# Patient Record
Sex: FEMALE | Race: WHITE | Employment: PART TIME | URBAN - METROPOLITAN AREA
[De-identification: names, ages, dates, MRNs, and addresses within clinical notes are randomized per-mention and may not be internally consistent; named-entity substitution may affect disease eponyms.]

---

## 2017-01-20 ENCOUNTER — HOSPITAL ENCOUNTER (OUTPATIENT)
Dept: GENERAL RADIOLOGY | Age: 67
Discharge: HOME | End: 2017-01-20
Payer: COMMERCIAL

## 2017-01-20 DIAGNOSIS — R05.9 COUGH: ICD-10-CM

## 2017-01-20 PROCEDURE — 71020 XR CHEST PA LAT: CPT

## 2017-04-03 ENCOUNTER — HOSPITAL ENCOUNTER (OUTPATIENT)
Dept: CT IMAGING | Age: 67
Discharge: HOME | End: 2017-04-03
Attending: INTERNAL MEDICINE
Payer: MEDICARE

## 2017-04-03 DIAGNOSIS — A31.0 PULMONARY DISEASES DUE TO OTHER MYCOBACTERIA: ICD-10-CM

## 2017-04-03 PROCEDURE — 71250 CT THORAX DX C-: CPT

## 2017-07-27 ENCOUNTER — HOSPITAL ENCOUNTER (EMERGENCY)
Age: 67
Discharge: HOME | End: 2017-07-28
Attending: EMERGENCY MEDICINE
Payer: MEDICARE

## 2017-07-27 ENCOUNTER — APPOINTMENT (OUTPATIENT)
Dept: GENERAL RADIOLOGY | Age: 67
End: 2017-07-27
Attending: EMERGENCY MEDICINE
Payer: MEDICARE

## 2017-07-27 DIAGNOSIS — J47.1 BRONCHIECTASIS WITH (ACUTE) EXACERBATION (HCC): ICD-10-CM

## 2017-07-27 DIAGNOSIS — J44.1 CHRONIC OBSTRUCTIVE PULMONARY DISEASE WITH ACUTE EXACERBATION (HCC): Primary | ICD-10-CM

## 2017-07-27 DIAGNOSIS — J18.9 PNEUMONIA OF LEFT UPPER LOBE DUE TO INFECTIOUS ORGANISM: ICD-10-CM

## 2017-07-27 LAB
ALBUMIN SERPL BCP-MCNC: 3.5 G/DL (ref 3.5–5)
ALBUMIN/GLOB SERPL: 0.8 {RATIO} (ref 1.1–2.2)
ALP SERPL-CCNC: 88 U/L (ref 45–117)
ALT SERPL-CCNC: 25 U/L (ref 12–78)
ANION GAP BLD CALC-SCNC: 8 MMOL/L (ref 5–15)
AST SERPL W P-5'-P-CCNC: 23 U/L (ref 15–37)
BASOPHILS # BLD AUTO: 0 K/UL (ref 0–0.1)
BASOPHILS # BLD: 0 % (ref 0–1)
BILIRUB SERPL-MCNC: 0.2 MG/DL (ref 0.2–1)
BUN SERPL-MCNC: 18 MG/DL (ref 6–20)
BUN/CREAT SERPL: 20 (ref 12–20)
CALCIUM SERPL-MCNC: 9.6 MG/DL (ref 8.5–10.1)
CHLORIDE SERPL-SCNC: 104 MMOL/L (ref 97–108)
CK SERPL-CCNC: 136 U/L (ref 26–192)
CO2 SERPL-SCNC: 26 MMOL/L (ref 21–32)
CREAT SERPL-MCNC: 0.92 MG/DL (ref 0.55–1.02)
EOSINOPHIL # BLD: 0 K/UL (ref 0–0.4)
EOSINOPHIL NFR BLD: 0 % (ref 0–7)
ERYTHROCYTE [DISTWIDTH] IN BLOOD BY AUTOMATED COUNT: 15.3 % (ref 11.5–14.5)
GLOBULIN SER CALC-MCNC: 4.3 G/DL (ref 2–4)
GLUCOSE SERPL-MCNC: 108 MG/DL (ref 65–100)
HCT VFR BLD AUTO: 39.3 % (ref 35–47)
HGB BLD-MCNC: 12.8 G/DL (ref 11.5–16)
LYMPHOCYTES # BLD AUTO: 23 % (ref 12–49)
LYMPHOCYTES # BLD: 2.4 K/UL (ref 0.8–3.5)
MAGNESIUM SERPL-MCNC: 2.1 MG/DL (ref 1.6–2.4)
MCH RBC QN AUTO: 28.9 PG (ref 26–34)
MCHC RBC AUTO-ENTMCNC: 32.6 G/DL (ref 30–36.5)
MCV RBC AUTO: 88.7 FL (ref 80–99)
MONOCYTES # BLD: 0.7 K/UL (ref 0–1)
MONOCYTES NFR BLD AUTO: 6 % (ref 5–13)
NEUTS SEG # BLD: 7.5 K/UL (ref 1.8–8)
NEUTS SEG NFR BLD AUTO: 71 % (ref 32–75)
PHOSPHATE SERPL-MCNC: 3.2 MG/DL (ref 2.6–4.7)
PLATELET # BLD AUTO: 222 K/UL (ref 150–400)
POTASSIUM SERPL-SCNC: 4.1 MMOL/L (ref 3.5–5.1)
PROT SERPL-MCNC: 7.8 G/DL (ref 6.4–8.2)
RBC # BLD AUTO: 4.43 M/UL (ref 3.8–5.2)
SODIUM SERPL-SCNC: 138 MMOL/L (ref 136–145)
TROPONIN I SERPL-MCNC: <0.04 NG/ML
WBC # BLD AUTO: 10.6 K/UL (ref 3.6–11)

## 2017-07-27 PROCEDURE — 77030029684 HC NEB SM VOL KT MONA -A

## 2017-07-27 PROCEDURE — 85379 FIBRIN DEGRADATION QUANT: CPT | Performed by: EMERGENCY MEDICINE

## 2017-07-27 PROCEDURE — 99285 EMERGENCY DEPT VISIT HI MDM: CPT

## 2017-07-27 PROCEDURE — 74011250637 HC RX REV CODE- 250/637: Performed by: EMERGENCY MEDICINE

## 2017-07-27 PROCEDURE — 71020 XR CHEST PA LAT: CPT

## 2017-07-27 PROCEDURE — 80053 COMPREHEN METABOLIC PANEL: CPT | Performed by: EMERGENCY MEDICINE

## 2017-07-27 PROCEDURE — 84484 ASSAY OF TROPONIN QUANT: CPT | Performed by: EMERGENCY MEDICINE

## 2017-07-27 PROCEDURE — 84100 ASSAY OF PHOSPHORUS: CPT | Performed by: EMERGENCY MEDICINE

## 2017-07-27 PROCEDURE — 74011000250 HC RX REV CODE- 250: Performed by: EMERGENCY MEDICINE

## 2017-07-27 PROCEDURE — 85610 PROTHROMBIN TIME: CPT | Performed by: EMERGENCY MEDICINE

## 2017-07-27 PROCEDURE — 36415 COLL VENOUS BLD VENIPUNCTURE: CPT | Performed by: EMERGENCY MEDICINE

## 2017-07-27 PROCEDURE — 93005 ELECTROCARDIOGRAM TRACING: CPT

## 2017-07-27 PROCEDURE — 94640 AIRWAY INHALATION TREATMENT: CPT

## 2017-07-27 PROCEDURE — 83735 ASSAY OF MAGNESIUM: CPT | Performed by: EMERGENCY MEDICINE

## 2017-07-27 PROCEDURE — 85025 COMPLETE CBC W/AUTO DIFF WBC: CPT | Performed by: EMERGENCY MEDICINE

## 2017-07-27 PROCEDURE — 82550 ASSAY OF CK (CPK): CPT | Performed by: EMERGENCY MEDICINE

## 2017-07-27 PROCEDURE — 85730 THROMBOPLASTIN TIME PARTIAL: CPT | Performed by: EMERGENCY MEDICINE

## 2017-07-27 RX ORDER — ASPIRIN 325 MG
325 TABLET ORAL ONCE
Status: COMPLETED | OUTPATIENT
Start: 2017-07-27 | End: 2017-07-27

## 2017-07-27 RX ORDER — HYDROCODONE POLISTIREX AND CHLORPHENIRAMINE POLISTIREX 10; 8 MG/5ML; MG/5ML
5 SUSPENSION, EXTENDED RELEASE ORAL
Status: COMPLETED | OUTPATIENT
Start: 2017-07-27 | End: 2017-07-27

## 2017-07-27 RX ADMIN — ALBUTEROL SULFATE 1 DOSE: 2.5 SOLUTION RESPIRATORY (INHALATION) at 23:24

## 2017-07-27 RX ADMIN — HYDROCODONE POLISTIREX AND CHLORPHENIRAMINE POLISTIREX 5 ML: 10; 8 SUSPENSION, EXTENDED RELEASE ORAL at 23:27

## 2017-07-27 RX ADMIN — ASPIRIN 325 MG: 325 TABLET ORAL at 23:34

## 2017-07-28 ENCOUNTER — APPOINTMENT (OUTPATIENT)
Dept: CT IMAGING | Age: 67
End: 2017-07-28
Attending: EMERGENCY MEDICINE
Payer: MEDICARE

## 2017-07-28 VITALS
TEMPERATURE: 98.3 F | RESPIRATION RATE: 22 BRPM | DIASTOLIC BLOOD PRESSURE: 64 MMHG | HEIGHT: 66 IN | OXYGEN SATURATION: 94 % | WEIGHT: 156.38 LBS | SYSTOLIC BLOOD PRESSURE: 129 MMHG | HEART RATE: 111 BPM | BODY MASS INDEX: 25.13 KG/M2

## 2017-07-28 LAB
APTT PPP: 28.3 SEC (ref 22.1–32.5)
ATRIAL RATE: 101 BPM
ATRIAL RATE: 110 BPM
CALCULATED P AXIS, ECG09: 54 DEGREES
CALCULATED P AXIS, ECG09: 73 DEGREES
CALCULATED R AXIS, ECG10: -14 DEGREES
CALCULATED R AXIS, ECG10: 51 DEGREES
CALCULATED T AXIS, ECG11: 45 DEGREES
CALCULATED T AXIS, ECG11: 66 DEGREES
D DIMER PPP FEU-MCNC: 0.37 MG/L FEU (ref 0–0.65)
DIAGNOSIS, 93000: NORMAL
DIAGNOSIS, 93000: NORMAL
INR PPP: 1 (ref 0.9–1.1)
P-R INTERVAL, ECG05: 178 MS
P-R INTERVAL, ECG05: 180 MS
PROTHROMBIN TIME: 10.6 SEC (ref 9–11.1)
Q-T INTERVAL, ECG07: 344 MS
Q-T INTERVAL, ECG07: 346 MS
QRS DURATION, ECG06: 78 MS
QRS DURATION, ECG06: 82 MS
QTC CALCULATION (BEZET), ECG08: 446 MS
QTC CALCULATION (BEZET), ECG08: 468 MS
THERAPEUTIC RANGE,PTTT: NORMAL SECS (ref 58–77)
TROPONIN I SERPL-MCNC: <0.04 NG/ML
VENTRICULAR RATE, ECG03: 101 BPM
VENTRICULAR RATE, ECG03: 110 BPM

## 2017-07-28 PROCEDURE — 71275 CT ANGIOGRAPHY CHEST: CPT

## 2017-07-28 PROCEDURE — 84484 ASSAY OF TROPONIN QUANT: CPT | Performed by: EMERGENCY MEDICINE

## 2017-07-28 PROCEDURE — 74011250637 HC RX REV CODE- 250/637: Performed by: EMERGENCY MEDICINE

## 2017-07-28 PROCEDURE — 36415 COLL VENOUS BLD VENIPUNCTURE: CPT | Performed by: EMERGENCY MEDICINE

## 2017-07-28 PROCEDURE — 74011000258 HC RX REV CODE- 258: Performed by: EMERGENCY MEDICINE

## 2017-07-28 PROCEDURE — 74011636320 HC RX REV CODE- 636/320: Performed by: EMERGENCY MEDICINE

## 2017-07-28 PROCEDURE — 93005 ELECTROCARDIOGRAM TRACING: CPT

## 2017-07-28 RX ORDER — SODIUM CHLORIDE 0.9 % (FLUSH) 0.9 %
10 SYRINGE (ML) INJECTION
Status: COMPLETED | OUTPATIENT
Start: 2017-07-28 | End: 2017-07-28

## 2017-07-28 RX ORDER — LEVOFLOXACIN 750 MG/1
750 TABLET ORAL DAILY
Qty: 10 TAB | Refills: 0 | Status: SHIPPED | OUTPATIENT
Start: 2017-07-28

## 2017-07-28 RX ORDER — HYDROCODONE POLISTIREX AND CHLORPHENIRAMINE POLISTIREX 10; 8 MG/5ML; MG/5ML
5 SUSPENSION, EXTENDED RELEASE ORAL
Qty: 60 ML | Refills: 0 | Status: SHIPPED | OUTPATIENT
Start: 2017-07-28

## 2017-07-28 RX ORDER — PREDNISONE 20 MG/1
60 TABLET ORAL DAILY
Qty: 18 TAB | Refills: 0 | Status: SHIPPED | OUTPATIENT
Start: 2017-07-28 | End: 2017-08-03

## 2017-07-28 RX ADMIN — SODIUM CHLORIDE 100 ML: 900 INJECTION, SOLUTION INTRAVENOUS at 00:34

## 2017-07-28 RX ADMIN — LEVOFLOXACIN 750 MG: 500 TABLET, FILM COATED ORAL at 02:13

## 2017-07-28 RX ADMIN — IOPAMIDOL 70 ML: 755 INJECTION, SOLUTION INTRAVENOUS at 00:34

## 2017-07-28 RX ADMIN — Medication 10 ML: at 00:34

## 2017-07-28 NOTE — ED NOTES
Bedside and Verbal shift change report given to Garret Ayala RN (oncoming nurse) by John Espinoza RN (offgoing nurse). Report included the following information SBAR, ED Summary, MAR, Recent Results and Cardiac Rhythm sinus tach.

## 2017-07-28 NOTE — ED PROVIDER NOTES
HPI Comments: 74 y.o. female with past medical history significant for COPD, anemia, PNA, arrhythmia, asthma, GERD who presents accompanied by  with chief complaint of chest pain. Patient complains of \"7 or 8/10\" left sided chest pain that came on suddenly while she was watching TV around 1930 today. Patient states the pain is dull at rest then sharp with breathing. Patient denies previous occurrence of chest pain. Patient states she's also had an occasionally productive cough (reports nonproductive tonight) for some time. Patient states she had a bronchoscopy on 6/7/17 and coughed up blood 5 days after. Patient states she has an inhaler that she uses BID. Patient denies nausea or vomiting. There are no other acute medical concerns at this time. Social hx: former smoker, occasional alcohol drinker, no illicit drugs  PCP: Doreen Trujillo MD  Cardiologist: Addie Hankins MD ([facility])    Note written by Lucretia Gillespie, as dictated by Neel Haider MD 11:13 PM      The history is provided by the patient. No  was used. Past Medical History:   Diagnosis Date    Anemia NEC 9/12    mild with GI Bleeding    Arrhythmia     fast HR    Arthritis     psoriatic    Asthma     COPD     wheezing, tightness in chest, sob, fast heart beat, coughing.     Esophageal spasm     GERD (gastroesophageal reflux disease)     GI bleed     Pneumonia 1988, 6/2011       Past Surgical History:   Procedure Laterality Date    COLONOSCOPY  10/23/12    Brand-int hemorrhoids,     EGD      Brand    HX CATARACT REMOVAL  1/2011    lens implant (L) eye/and right eye    [facility]    laparoscopy to become pregnant (endometrosis)    FATOU MAMMOGRAM DIGITAL BILATERAL  2/13    PAP SMEAR  8/12    PNEUM MARVIN W/RENETTA PRESSURE           Family History:   Problem Relation Age of Onset    Hypertension Mother     Hypertension Father     Arthritis-osteo Father        Social History     Social History    Marital status:      Spouse name: N/A    Number of children: 1    Years of education: N/A     Occupational History    Realtor      Social History Main Topics    Smoking status: Former Smoker     Packs/day: 1.00     Years: 2.00     Types: Cigarettes     Quit date: 1/1/1978    Smokeless tobacco: Never Used    Alcohol use 1.0 oz/week     1 Cans of beer, 1 Shots of liquor per week      Comment: 2-3 per week,     Drug use: Not on file    Sexual activity: Yes     Partners: Male     Birth control/ protection: None     Other Topics Concern    Not on file     Social History Narrative         ALLERGIES: Macrodantin [nitrofurantoin macrocrystalline] and Nsaids (non-steroidal anti-inflammatory drug)    Review of Systems   Constitutional: Negative for appetite change, chills, fatigue and fever. HENT: Negative for congestion, rhinorrhea and sore throat. Respiratory: Positive for cough. Negative for shortness of breath. Cardiovascular: Positive for chest pain. Negative for leg swelling. Gastrointestinal: Negative for abdominal pain, constipation, nausea and vomiting. Genitourinary: Negative for difficulty urinating and dysuria. Musculoskeletal: Negative for back pain and neck pain. Skin: Negative for rash and wound. Neurological: Negative for headaches. All other systems reviewed and are negative. Vitals:    07/27/17 2229   BP: 139/61   Pulse: (!) 116   Resp: 16   Temp: 98.3 °F (36.8 °C)   SpO2: 93%   Weight: 70.9 kg (156 lb 6 oz)   Height: 5' 6\" (1.676 m)            Physical Exam   Nursing note and vitals reviewed. CONSTITUTIONAL: Well-appearing; well-nourished; in no apparent distress  HEAD: Normocephalic; atraumatic  EYES: PERRL; EOM intact; conjunctiva and sclera are clear bilaterally. ENT: No rhinorrhea; normal pharynx with no tonsillar hypertrophy; mucous membranes pink/moist, no erythema, no exudate.   NECK: Supple; non-tender; no cervical lymphadenopathy  CARD: Normal S1, S2; no murmurs, rubs, or gallops. Increased Regular rate and rhythm. RESP: Normal respiratory effort; breath sounds clear and equal bilaterally; no wheezes, rhonchi, or rales. ABD: Normal bowel sounds; non-distended; non-tender; no palpable organomegaly, no masses, no bruits. Back Exam: Normal inspection; no vertebral point tenderness, no CVA tenderness. Normal range of motion. EXT: Normal ROM in all four extremities; non-tender to palpation; no swelling or deformity; distal pulses are normal, no edema. SKIN: Warm; dry; no rash. NEURO:Alert and oriented x 3, coherent, RUFUS-XII grossly intact, sensory and motor are non-focal.        MDM  Number of Diagnoses or Management Options  Bronchiectasis with (acute) exacerbation ([facility]):   Chronic obstructive pulmonary disease with acute exacerbation ([facility]):   Pneumonia of left upper lobe due to infectious organism [facility]):   Diagnosis management comments: Assessment: 57-year-old female with history of prostate disease, and pneumonia, who presents with intermittent episodes of left-sided chest pain, with a pleuritic component. The fascia diagnosis includes ACS, and electrolyte abnormality, pneumonia, VTE, pleurisy, pneumothorax. Plan: EKG/ chest x-ray/ lab/ IV fluid/ Duo Neb and steroid/ CT scan of the chest/ serial exams/ Monitor and Reevaluate.          Amount and/or Complexity of Data Reviewed  Clinical lab tests: ordered and reviewed  Tests in the radiology section of CPT®: ordered and reviewed  Tests in the medicine section of CPT®: reviewed and ordered  Discussion of test results with the performing providers: yes  Decide to obtain previous medical records or to obtain history from someone other than the patient: yes  Obtain history from someone other than the patient: yes  Review and summarize past medical records: yes  Discuss the patient with other providers: yes  Independent visualization of images, tracings, or specimens: yes    Risk of Complications, Morbidity, and/or Mortality  Presenting problems: moderate  Diagnostic procedures: moderate  Management options: moderate      ED Course       Procedures     ED EKG interpretation:  Rhythm: sinus tachycardia; and regular . Rate (approx.): 101; Axis: left axis deviation; P wave: normal; QRS interval: normal ; ST/T wave: non-specific changes; in  Lead: Diffusely; Other findings: abnormal ekg. This EKG was interpreted by Lisa Thompson MD,ED Provider. XRAY INTERPRETATION (ED MD)  Chest Xray  Left upper lobe opacities concerning for pneumonia. Normal heart size. No bony abnormalities. Lisa Thompson MD 10:56 PM    Progress Note:   Pt has been reexamined by Lisa Thompson MD. Pt is feeling much better. Symptoms have improved. All available results have been reviewed with pt and any available family. the patient had  2 negative. EKG and set of cardiac enzymes. Symptoms appear consistent with pneumonia/pleurisy. She is afebrile, non-tachypneic and not hypoxic and vital signs are stable. Pt understands sx, dx, and tx in ED. Care plan has been outlined and questions have been answered. Pt is ready to go home. Will send home on chest pain/ pneumonia/ COPD exacerbation/bronchiectasis. Instructions. Prescription of Tussionex, Levaquin, and prednisone. Outpatient referral with PCP/ Pulmonologist in 3-5 days for reevaluation as needed. Written by Lisa Thompson MD,2:57 AM    .   .

## 2017-07-28 NOTE — ED NOTES
Pt remains tachycardic 1 hr post neb treatment. Pt states that she has a \"fast heart beat\" and takes Diltiazem at home for it. MD notified. No further orders received.

## 2017-07-28 NOTE — ED NOTES
Pt returned from CT. Patient has no complaints at this time. Rates pain a 4/10. Does not have to use the restroom at this time. Patient in a position of comfort. Belongings secure in their room, side rails up x 2, bedside table within reach, and call bell within reach. Will continue to monitor and re-assess as appropriate.

## 2017-07-28 NOTE — DISCHARGE INSTRUCTIONS
We hope that we have addressed all of your medical concerns. The examination and treatment you received in the Emergency Department were for an emergent problem and were not intended as complete care. It is important that you follow up with your healthcare provider(s) for ongoing care. If your symptoms worsen or do not improve as expected, and you are unable to reach your usual health care provider(s), you should return to the Emergency Department. Today's healthcare is undergoing tremendous change, and patient satisfaction surveys are one of the many tools to assess the quality of medical care. You may receive a survey from the [employer] organization regarding your experience in the Emergency Department. I hope that your experience has been completely positive, particularly the medical care that I provided. As such, please participate in the survey; anything less than excellent does not meet my expectations or intentions. [facility] and [facility] participate in nationally recognized quality of care measures. If your blood pressure is greater than 120/80, as reported below, we urge that you seek medical care to address the potential of high blood pressure, commonly known as hypertension. Hypertension can be hereditary or can be caused by certain medical conditions, pain, stress, or \"white coat syndrome. \"       Please make an appointment with your health care provider(s) for follow up of your Emergency Department visit.        VITALS:   Patient Vitals for the past 8 hrs:   Temp Pulse Resp BP SpO2   07/28/17 0200 - (!) 111 22 123/52 92 %   07/28/17 0130 - (!) 120 19 123/54 93 %   07/28/17 0100 - (!) 125 16 129/52 96 %   07/28/17 0030 - (!) 118 18 142/52 94 %   07/28/17 0000 - (!) 109 21 148/58 99 %   07/27/17 2330 - 94 21 134/65 97 %   07/27/17 2300 - 95 23 133/66 96 %   07/27/17 2229 98.3 °F (36.8 °C) (!) 116 16 139/61 93 %          Thank you for allowing us to provide you with medical care today. We realize that you have many choices for your emergency care needs. Please choose us in the future for any continued health care needs. Jenny Cabezas MD    [address] [facility].   Office: [phone]            Recent Results (from the past 24 hour(s))   CBC WITH AUTOMATED DIFF    Collection Time: 07/27/17 10:34 PM   Result Value Ref Range    WBC 10.6 3.6 - 11.0 K/uL    RBC 4.43 3.80 - 5.20 M/uL    HGB 12.8 11.5 - 16.0 g/dL    HCT 39.3 35.0 - 47.0 %    MCV 88.7 80.0 - 99.0 FL    MCH 28.9 26.0 - 34.0 PG    MCHC 32.6 30.0 - 36.5 g/dL    RDW 15.3 (H) 11.5 - 14.5 %    PLATELET 581 243 - 069 K/uL    NEUTROPHILS 71 32 - 75 %    LYMPHOCYTES 23 12 - 49 %    MONOCYTES 6 5 - 13 %    EOSINOPHILS 0 0 - 7 %    BASOPHILS 0 0 - 1 %    ABS. NEUTROPHILS 7.5 1.8 - 8.0 K/UL    ABS. LYMPHOCYTES 2.4 0.8 - 3.5 K/UL    ABS. MONOCYTES 0.7 0.0 - 1.0 K/UL    ABS. EOSINOPHILS 0.0 0.0 - 0.4 K/UL    ABS. BASOPHILS 0.0 0.0 - 0.1 K/UL   METABOLIC PANEL, COMPREHENSIVE    Collection Time: 07/27/17 10:34 PM   Result Value Ref Range    Sodium 138 136 - 145 mmol/L    Potassium 4.1 3.5 - 5.1 mmol/L    Chloride 104 97 - 108 mmol/L    CO2 26 21 - 32 mmol/L    Anion gap 8 5 - 15 mmol/L    Glucose 108 (H) 65 - 100 mg/dL    BUN 18 6 - 20 MG/DL    Creatinine 0.92 0.55 - 1.02 MG/DL    BUN/Creatinine ratio 20 12 - 20      GFR est AA >60 >60 ml/min/1.73m2    GFR est non-AA >60 >60 ml/min/1.73m2    Calcium 9.6 8.5 - 10.1 MG/DL    Bilirubin, total 0.2 0.2 - 1.0 MG/DL    ALT (SGPT) 25 12 - 78 U/L    AST (SGOT) 23 15 - 37 U/L    Alk.  phosphatase 88 45 - 117 U/L    Protein, total 7.8 6.4 - 8.2 g/dL    Albumin 3.5 3.5 - 5.0 g/dL    Globulin 4.3 (H) 2.0 - 4.0 g/dL    A-G Ratio 0.8 (L) 1.1 - 2.2     TROPONIN I    Collection Time: 07/27/17 10:34 PM   Result Value Ref Range    Troponin-I, Qt. <0.04 <0.05 ng/mL   CK W/ REFLX CKMB    Collection Time: 07/27/17 10:34 PM   Result Value Ref Range     26 - 192 U/L   D DIMER    Collection Time: 07/27/17 10:34 PM   Result Value Ref Range    D-dimer 0.37 0.00 - 0.65 mg/L FEU   MAGNESIUM    Collection Time: 07/27/17 10:34 PM   Result Value Ref Range    Magnesium 2.1 1.6 - 2.4 mg/dL   PTT    Collection Time: 07/27/17 10:34 PM   Result Value Ref Range    aPTT 28.3 22.1 - 32.5 sec    aPTT, therapeutic range     58.0 - 77.0 SECS   PROTHROMBIN TIME + INR    Collection Time: 07/27/17 10:34 PM   Result Value Ref Range    INR 1.0 0.9 - 1.1      Prothrombin time 10.6 9.0 - 11.1 sec   PHOSPHORUS    Collection Time: 07/27/17 10:34 PM   Result Value Ref Range    Phosphorus 3.2 2.6 - 4.7 MG/DL   EKG, 12 LEAD, INITIAL    Collection Time: 07/27/17 10:34 PM   Result Value Ref Range    Ventricular Rate 101 BPM    Atrial Rate 101 BPM    P-R Interval 178 ms    QRS Duration 82 ms    Q-T Interval 344 ms    QTC Calculation (Bezet) 446 ms    Calculated P Axis 73 degrees    Calculated R Axis 51 degrees    Calculated T Axis 66 degrees    Diagnosis       Sinus tachycardia  When compared with ECG of 06-SEP-2012 09:06,  No significant change was found     TROPONIN I    Collection Time: 07/28/17  1:37 AM   Result Value Ref Range    Troponin-I, Qt. <0.04 <0.05 ng/mL   EKG, 12 LEAD, INITIAL    Collection Time: 07/28/17  1:42 AM   Result Value Ref Range    Ventricular Rate 110 BPM    Atrial Rate 110 BPM    P-R Interval 180 ms    QRS Duration 78 ms    Q-T Interval 346 ms    QTC Calculation (Bezet) 468 ms    Calculated P Axis 54 degrees    Calculated R Axis -14 degrees    Calculated T Axis 45 degrees    Diagnosis       Sinus tachycardia  When compared with ECG of 27-JUL-2017 22:34,  MANUAL COMPARISON REQUIRED, DATA IS UNCONFIRMED         Xr Chest Pa Lat    Result Date: 7/27/2017  History: Chest pain. 2 views of the chest demonstrate unremarkable cardiomediastinal contours. There is parenchymal scarring bilaterally.  There is new airspace opacification in the left and upper and lower lobes. There is a calcified granuloma in the right lung. The osseous structures appear unremarkable. IMPRESSION: Left upper lobe and left lower lobe airspace opacification concerning for pneumonia. Cta Chest W Or W Wo Cont    Result Date: 7/28/2017  EXAM:  CTA CHEST W OR W WO CONT INDICATION:   cp COMPARISON: CT of the chest, 4/3/2017. Chloe Camp TECHNIQUE: Precontrast  images were obtained to localize the volume for acquisition. Multislice helical CT arteriography was performed from the diaphragm to the thoracic inlet during uneventful rapid bolus intravenous contrast administration. Lung and soft tissue windows were generated. Coronal and sagittal images were generated and 3D post processing consisting of coronal maximum intensity images was performed. CT dose reduction was achieved through use of a standardized protocol tailored for this examination and automatic exposure control for dose modulation. Chloe Camp FINDINGS: CHEST: Chest wall/thoracic inlet: Within normal limits. Thyroid: Within normal limits. Mediastinum/harsh: Within normal limits. Heart/vessels: No visible pulmonary embolus. Ectasia of the pulmonary artery measuring approximately 3.3 cm. Lungs/Pleura: Redemonstrated are multiple areas of tree in bud micronodularity and discrete nodules throughout the lungs, most pronounced in the bases with associated bronchiectasis. These nodules measure up to approximately 1 cm and do not appear significantly changed. Small area of groundglass opacity in the anterior aspect of the left upper lobe and in the lateral aspect of the right upper lobe . INCIDENTALLY IMAGED ABDOMEN: Punctate calculi in the spleen suggesting remote granulomatous disease. Hiatal hernia. Chloe Camp MSK: Within normal limits. . IMPRESSION: 1. No visible pulmonary embolus. 2. Diffuse tree-in-bud micronodular the with discrete nodules and associated bronchiectasis. Running suggest chronic atypical infectious process such as mycoplasma.  3. Small areas of groundglass attenuation in the left upper lobe and right upper lobe which could represent more active disease. Bronchiectasis: Care Instructions  Your Care Instructions  Bronchiectasis (say \"dxazg-zce-PYI-tuh-sanjay\") is a lung problem in which the breathing tubes are stretched and become larger. The buildup of mucus causes the stretching and can lead to swelling and infections. You may find it harder to breathe and cough up mucus out of your lungs. Some people are born with it. Other people get it because of another health problem, usually cystic fibrosis or a lung infection such as pneumonia or tuberculosis. Symptoms are often different for everyone. But a cough that brings up mucus, or sputum, is common. The condition is usually treated with antibiotics, medicines to relax the airways (bronchodilators), and medicines to make it easier to cough up mucus (expectorants). Follow-up care is a key part of your treatment and safety. Be sure to make and go to all appointments, and call your doctor if you are having problems. It's also a good idea to know your test results and keep a list of the medicines you take. How can you care for yourself at home? · Take your antibiotics as directed. Do not stop taking them just because you feel better. You need to take the full course of antibiotics. · Take your medicines exactly as prescribed. Call your doctor if you think you are having a problem with your medicine. · If you have cystic fibrosis, follow your treatment plan. · If you or your child has bronchiectasis, follow directions from your doctor or respiratory therapist for moving your or your child's body into different positions to help drain fluid. This is called postural drainage, and it helps to ease breathing and prevent infections. · You also may do chest percussion on your child. This is strong clapping of the chest with a cupped hand to vibrate the airways in the lungs.  The vibration helps your child cough up mucus. You may see a respiratory therapist to learn how to do chest percussion. · Use an airway clearance device, such as a flutter valve, as directed to help remove mucus from the lungs. · Avoid lung infections. ¨ Get shots to protect against the flu and pneumococcal disease. ¨ Wash your hands frequently. ¨ Avoid close contact with people who have colds or the flu. ¨ Do not smoke or allow others to smoke around you. If you need help quitting, talk to your doctor about stop-smoking programs and medicines. These can increase your chances of quitting for good. ¨ Stay inside, if you can, on days when the pollution level is high. When should you call for help? Call 911 anytime you think you may need emergency care. For example, call if:  · You have severe trouble breathing. · You cough up more than 1 cup of blood. Call your doctor now or seek immediate medical care if:  · You have chest pain. · You have shortness of breath. · You have a fever. · Your mucus (sputum) is bloody or changes color. Watch closely for changes in your health, and be sure to contact your doctor if:  · You are coughing up more sputum than before. · Your symptoms get worse or do not get better with treatment. Where can you learn more? Go to http://dary-ramon.info/. Enter C667 in the search box to learn more about \"Bronchiectasis: Care Instructions. \"  Current as of: March 25, 2017  Content Version: 11.3  © 1447-3772 [employer]. Care instructions adapted under license by [facility] (which disclaims liability or warranty for this information). If you have questions about a medical condition or this instruction, always ask your healthcare professional. [place] [address] any warranty or liability for your use of this information. Pneumonia: Care Instructions  Your Care Instructions    Pneumonia is an infection of the lungs.  Most cases are caused by infections from bacteria or viruses. Pneumonia may be mild or very severe. If it is caused by bacteria, you will be treated with antibiotics. It may take a few weeks to a few months to recover fully from pneumonia, depending on how sick you were and whether your overall health is good. Follow-up care is a key part of your treatment and safety. Be sure to make and go to all appointments, and call your doctor if you are having problems. Its also a good idea to know your test results and keep a list of the medicines you take. How can you care for yourself at home? · Take your antibiotics exactly as directed. Do not stop taking the medicine just because you are feeling better. You need to take the full course of antibiotics. · Take your medicines exactly as prescribed. Call your doctor if you think you are having a problem with your medicine. · Get plenty of rest and sleep. You may feel weak and tired for a while, but your energy level will improve with time. · To prevent dehydration, drink plenty of fluids, enough so that your urine is light yellow or clear like water. Choose water and other caffeine-free clear liquids until you feel better. If you have kidney, heart, or liver disease and have to limit fluids, talk with your doctor before you increase the amount of fluids you drink. · Take care of your cough so you can rest. A cough that brings up mucus from your lungs is common with pneumonia. It is one way your body gets rid of the infection. But if coughing keeps you from resting or causes severe fatigue and chest-wall pain, talk to your doctor. He or she may suggest that you take a medicine to reduce the cough. · Use a vaporizer or humidifier to add moisture to your bedroom. Follow the directions for cleaning the machine. · Do not smoke or allow others to smoke around you. Smoke will make your cough last longer.  If you need help quitting, talk to your doctor about stop-smoking programs and medicines. These can increase your chances of quitting for good. · Take an over-the-counter pain medicine, such as acetaminophen (Tylenol), ibuprofen (Advil, Motrin), or naproxen (Aleve). Read and follow all instructions on the label. · Do not take two or more pain medicines at the same time unless the doctor told you to. Many pain medicines have acetaminophen, which is Tylenol. Too much acetaminophen (Tylenol) can be harmful. · If you were given a spirometer to measure how well your lungs are working, use it as instructed. This can help your doctor tell how your recovery is going. · To prevent pneumonia in the future, talk to your doctor about getting a flu vaccine (once a year) and a pneumococcal vaccine (one time only for most people). When should you call for help? Call 911 anytime you think you may need emergency care. For example, call if:  · You have severe trouble breathing. Call your doctor now or seek immediate medical care if:  · You cough up dark brown or bloody mucus (sputum). · You have new or worse trouble breathing. · You are dizzy or lightheaded, or you feel like you may faint. Watch closely for changes in your health, and be sure to contact your doctor if:  · You have a new or higher fever. · You are coughing more deeply or more often. · You are not getting better after 2 days (48 hours). · You do not get better as expected. Where can you learn more? Go to http://dary-ramon.info/. Enter [id number] in the search box to learn more about \"Pneumonia: Care Instructions. \"  Current as of: March 25, 2017  Content Version: 11.3  © 8318-9715 [employer]. Care instructions adapted under license by [employer] (which disclaims liability or warranty for this information).  If you have questions about a medical condition or this instruction, always ask your healthcare professional. [facility] any warranty or liability for your use of this information.

## 2017-07-28 NOTE — ED TRIAGE NOTES
Pt states that she has left sided chest pain that started around 7pm, pt states that she also has SOB, pt denies NVD.

## 2018-01-15 ENCOUNTER — HOSPITAL ENCOUNTER (OUTPATIENT)
Dept: GENERAL RADIOLOGY | Age: 68
Discharge: HOME | End: 2018-01-15
Payer: MEDICARE

## 2018-01-15 DIAGNOSIS — R06.02 SOB (SHORTNESS OF BREATH): ICD-10-CM

## 2018-01-15 PROCEDURE — 71046 X-RAY EXAM CHEST 2 VIEWS: CPT

## 2021-06-06 ENCOUNTER — HOSPITAL ENCOUNTER (EMERGENCY)
Age: 71
Discharge: HOME | End: 2021-06-07
Attending: EMERGENCY MEDICINE
Payer: MEDICARE

## 2021-06-06 ENCOUNTER — APPOINTMENT (OUTPATIENT)
Dept: CT IMAGING | Age: 71
End: 2021-06-06
Attending: EMERGENCY MEDICINE
Payer: MEDICARE

## 2021-06-06 DIAGNOSIS — Z22.39 MYCOBACTERIUM AVIUM COMPLEX COLONIZATION: ICD-10-CM

## 2021-06-06 DIAGNOSIS — R07.89 OTHER CHEST PAIN: Primary | ICD-10-CM

## 2021-06-06 LAB
ALBUMIN SERPL-MCNC: 3.9 G/DL (ref 3.5–5)
ALBUMIN/GLOB SERPL: 0.8 {RATIO} (ref 1.1–2.2)
ALP SERPL-CCNC: 105 U/L (ref 45–117)
ALT SERPL-CCNC: 24 U/L (ref 12–78)
ANION GAP SERPL CALC-SCNC: 7 MMOL/L (ref 5–15)
AST SERPL-CCNC: 20 U/L (ref 15–37)
BASOPHILS # BLD: 0.1 K/UL (ref 0–0.1)
BASOPHILS NFR BLD: 0 % (ref 0–1)
BILIRUB SERPL-MCNC: 0.4 MG/DL (ref 0.2–1)
BUN SERPL-MCNC: 14 MG/DL (ref 6–20)
BUN/CREAT SERPL: 18 (ref 12–20)
CALCIUM SERPL-MCNC: 9.9 MG/DL (ref 8.5–10.1)
CHLORIDE SERPL-SCNC: 105 MMOL/L (ref 97–108)
CO2 SERPL-SCNC: 25 MMOL/L (ref 21–32)
COMMENT, HOLDF: NORMAL
CREAT SERPL-MCNC: 0.79 MG/DL (ref 0.55–1.02)
DIFFERENTIAL METHOD BLD: ABNORMAL
EOSINOPHIL # BLD: 0 K/UL (ref 0–0.4)
EOSINOPHIL NFR BLD: 0 % (ref 0–7)
ERYTHROCYTE [DISTWIDTH] IN BLOOD BY AUTOMATED COUNT: 15 % (ref 11.5–14.5)
GLOBULIN SER CALC-MCNC: 4.9 G/DL (ref 2–4)
GLUCOSE SERPL-MCNC: 106 MG/DL (ref 65–100)
HCT VFR BLD AUTO: 42.4 % (ref 35–47)
HGB BLD-MCNC: 13.5 G/DL (ref 11.5–16)
IMM GRANULOCYTES # BLD AUTO: 0.1 K/UL (ref 0–0.04)
IMM GRANULOCYTES NFR BLD AUTO: 1 % (ref 0–0.5)
LYMPHOCYTES # BLD: 1.5 K/UL (ref 0.8–3.5)
LYMPHOCYTES NFR BLD: 10 % (ref 12–49)
MCH RBC QN AUTO: 29.8 PG (ref 26–34)
MCHC RBC AUTO-ENTMCNC: 31.8 G/DL (ref 30–36.5)
MCV RBC AUTO: 93.6 FL (ref 80–99)
MONOCYTES # BLD: 0.8 K/UL (ref 0–1)
MONOCYTES NFR BLD: 5 % (ref 5–13)
NEUTS SEG # BLD: 11.9 K/UL (ref 1.8–8)
NEUTS SEG NFR BLD: 84 % (ref 32–75)
NRBC # BLD: 0 K/UL (ref 0–0.01)
NRBC BLD-RTO: 0 PER 100 WBC
PLATELET # BLD AUTO: 231 K/UL (ref 150–400)
PMV BLD AUTO: 10 FL (ref 8.9–12.9)
POTASSIUM SERPL-SCNC: 3.7 MMOL/L (ref 3.5–5.1)
PROT SERPL-MCNC: 8.8 G/DL (ref 6.4–8.2)
RBC # BLD AUTO: 4.53 M/UL (ref 3.8–5.2)
SAMPLES BEING HELD,HOLD: NORMAL
SODIUM SERPL-SCNC: 137 MMOL/L (ref 136–145)
TROPONIN I SERPL-MCNC: <0.05 NG/ML
WBC # BLD AUTO: 14.3 K/UL (ref 3.6–11)

## 2021-06-06 PROCEDURE — 84484 ASSAY OF TROPONIN QUANT: CPT

## 2021-06-06 PROCEDURE — 93005 ELECTROCARDIOGRAM TRACING: CPT

## 2021-06-06 PROCEDURE — 99284 EMERGENCY DEPT VISIT MOD MDM: CPT

## 2021-06-06 PROCEDURE — 85025 COMPLETE CBC W/AUTO DIFF WBC: CPT

## 2021-06-06 PROCEDURE — 74011250637 HC RX REV CODE- 250/637: Performed by: EMERGENCY MEDICINE

## 2021-06-06 PROCEDURE — 71275 CT ANGIOGRAPHY CHEST: CPT

## 2021-06-06 PROCEDURE — 80053 COMPREHEN METABOLIC PANEL: CPT

## 2021-06-06 PROCEDURE — 74011250636 HC RX REV CODE- 250/636: Performed by: EMERGENCY MEDICINE

## 2021-06-06 PROCEDURE — 74011000636 HC RX REV CODE- 636: Performed by: RADIOLOGY

## 2021-06-06 PROCEDURE — 36415 COLL VENOUS BLD VENIPUNCTURE: CPT

## 2021-06-06 RX ORDER — GUAIFENESIN 100 MG/5ML
325 LIQUID (ML) ORAL
Status: COMPLETED | OUTPATIENT
Start: 2021-06-06 | End: 2021-06-06

## 2021-06-06 RX ORDER — ONDANSETRON 2 MG/ML
4 INJECTION INTRAMUSCULAR; INTRAVENOUS
Status: DISCONTINUED | OUTPATIENT
Start: 2021-06-06 | End: 2021-06-07 | Stop reason: HOSPADM

## 2021-06-06 RX ADMIN — ASPIRIN 324 MG: 81 TABLET, CHEWABLE ORAL at 23:14

## 2021-06-06 RX ADMIN — IOPAMIDOL 80 ML: 755 INJECTION, SOLUTION INTRAVENOUS at 23:23

## 2021-06-06 RX ADMIN — SODIUM CHLORIDE 1000 ML: 9 INJECTION, SOLUTION INTRAVENOUS at 23:15

## 2021-06-07 VITALS
DIASTOLIC BLOOD PRESSURE: 73 MMHG | SYSTOLIC BLOOD PRESSURE: 129 MMHG | HEART RATE: 97 BPM | RESPIRATION RATE: 16 BRPM | OXYGEN SATURATION: 92 % | TEMPERATURE: 99.6 F

## 2021-06-07 LAB — TROPONIN I SERPL-MCNC: <0.05 NG/ML

## 2021-06-07 PROCEDURE — 84484 ASSAY OF TROPONIN QUANT: CPT

## 2021-06-07 PROCEDURE — 36415 COLL VENOUS BLD VENIPUNCTURE: CPT

## 2021-06-07 NOTE — ED NOTES
Pt reported to registration staff that she felt more SOB. Rechecked O2 sats which are now 91%. Pt placed on 2L. Md informed.

## 2021-06-07 NOTE — ED PROVIDER NOTES
Please note that this dictation was completed with [employer], the computer voice recognition software.  Quite often unanticipated grammatical, syntax, homophones, and other interpretive errors are inadvertently transcribed by the computer software.  Please disregard these errors.  Please excuse any errors that have escaped final proofreading. 72-year-old female past medical history remarkable for mild anemia with GI bleed, fast heart rate, psoriatic arthritis, asthma, COPD, esophageal spasm, GERD, and pneumonia presents the ER complaining of \" patient there this evening at 630 and developed acute onset of left-sided chest pain under my left breast not on the skin \"feels deep. \"  Patient states is a pressure squeezing type pain worse with deep inspiration coughing. Patient states she has bronchiectasis so she coughs all the time however since then the coughing has worsened the pain. The pain is constant has not resolved she tried taking GERD medication for it but it is not helped in any way. Patient states she has felt slightly nauseated but has not broken into a sweat. She has not had a productive cough. She has not had any abdominal pain denies other current systemic complaints. There is been no burning with urination vaginal discharge or vision changes numbness tingling on 1 side of the body. pt denies HA, vison changes, diff swallowing, CP, SOB, Abd pain, F/Ch, N/V, D/Cons or other current systemic complaints    Social/ PSH reviewed in EMR    EMR Chart Reviewed                   Past Medical History:   Diagnosis Date    Anemia NEC 9/12    mild with GI Bleeding    Arrhythmia     fast HR    Arthritis     psoriatic    Asthma     COPD     wheezing, tightness in chest, sob, fast heart beat, coughing.     Esophageal spasm     GERD (gastroesophageal reflux disease)     GI bleed     Pneumonia 1988, 6/2011       Past Surgical History:   Procedure Laterality Date    COLONOSCOPY  10/23/12    Brand-int hemorrhoids,     EGD      Brand    HX CATARACT REMOVAL  2011    lens implant (L) eye/and right eye    HX GYN  1985    laparoscopy to become pregnant (endometrosis)    FATOU MAMMOGRAM DIGITAL BILATERAL      PAP SMEAR      PNEUM MARVIN W/RENETTA PRESSURE           Family History:   Problem Relation Age of Onset    Hypertension Mother     Hypertension Father     Arthritis-osteo Father        Social History     Socioeconomic History    Marital status:      Spouse name: Not on file    Number of children: 1    Years of education: Not on file    Highest education level: Not on file   Occupational History    Occupation: Realtor   Tobacco Use    Smoking status: Former Smoker     Packs/day: 1.00     Years: 2.00     Pack years: 2.00     Types: Cigarettes     Quit date: 1978     Years since quittin.4    Smokeless tobacco: Never Used   Substance and Sexual Activity    Alcohol use: Yes     Alcohol/week: 1.7 standard drinks     Types: 1 Cans of beer, 1 Shots of liquor per week     Comment: 2-3 per week,     Drug use: Not on file    Sexual activity: Yes     Partners: Male     Birth control/protection: None   Other Topics Concern    Not on file   Social History Narrative    Not on file     Social Determinants of Health     Financial Resource Strain:     Difficulty of Paying Living Expenses:    Food Insecurity:     Worried About Running Out of Food in the Last Year:     Ran Out of Food in the Last Year:    Transportation Needs:     Lack of Transportation (Medical):      Lack of Transportation (Non-Medical):    Physical Activity:     Days of Exercise per Week:     Minutes of Exercise per Session:    Stress:     Feeling of Stress :    Social Connections:     Frequency of Communication with Friends and Family:     Frequency of Social Gatherings with Friends and Family:     Attends [employer] Services:     Active Member of Clubs or Organizations:     Attends Club or Organization Meetings:     Marital Status:    Intimate Partner Violence:     Fear of Current or Ex-Partner:     Emotionally Abused:     Physically Abused:     Sexually Abused: ALLERGIES: Macrodantin [nitrofurantoin macrocrystalline] and Nsaids (non-steroidal anti-inflammatory drug)    Review of Systems   Constitutional: Negative for appetite change, chills and fever. HENT: Negative for dental problem, trouble swallowing and voice change. Eyes: Negative for visual disturbance. Respiratory: Positive for cough, chest tightness and shortness of breath. Negative for wheezing and stridor. Cardiovascular: Positive for chest pain. Negative for palpitations and leg swelling. Gastrointestinal: Positive for nausea. Negative for constipation, diarrhea and vomiting. Genitourinary: Negative for dysuria. Musculoskeletal: Negative for back pain. Skin: Negative for rash. Neurological: Negative for facial asymmetry and speech difficulty. Psychiatric/Behavioral: Negative for confusion. All other systems reviewed and are negative. Vitals:    06/06/21 2141 06/07/21 0230   BP: (!) 153/68 129/73   Pulse: (!) 102 97   Resp: 16 16   Temp: 98.9 °F (37.2 °C) 99.6 °F (37.6 °C)   SpO2: 95% 92%            Physical Exam  Vitals and nursing note reviewed. Constitutional:       General: She is not in acute distress. Appearance: Normal appearance. She is well-developed. She is not ill-appearing, toxic-appearing or diaphoretic. Comments: NAD, AxOx4, speaking in complete sentences     HENT:      Head: Normocephalic and atraumatic. Right Ear: External ear normal.      Left Ear: External ear normal.   Eyes:      General: No scleral icterus. Right eye: No discharge. Left eye: No discharge. Extraocular Movements: Extraocular movements intact. Conjunctiva/sclera: Conjunctivae normal.      Pupils: Pupils are equal, round, and reactive to light. Neck:      Vascular: No JVD. Trachea: No tracheal deviation. Cardiovascular:      Rate and Rhythm: Normal rate and regular rhythm. Pulses: Normal pulses. Heart sounds: Normal heart sounds. No murmur heard. No friction rub. No gallop. Pulmonary:      Effort: Pulmonary effort is normal. No respiratory distress. Breath sounds: Normal breath sounds. No stridor. No wheezing, rhonchi or rales. Chest:      Chest wall: No tenderness. Abdominal:      General: Bowel sounds are normal.      Palpations: Abdomen is soft. Tenderness: There is no abdominal tenderness. There is no guarding or rebound. Comments: nttp       Genitourinary:     Vagina: No vaginal discharge. Musculoskeletal:         General: No swelling, tenderness, deformity or signs of injury. Normal range of motion. Cervical back: Normal range of motion and neck supple. No rigidity or tenderness. Right lower leg: No edema. Left lower leg: No edema. Skin:     General: Skin is warm and dry. Capillary Refill: Capillary refill takes less than 2 seconds. Coloration: Skin is not jaundiced or pale. Findings: No bruising, erythema, lesion or rash. Neurological:      General: No focal deficit present. Mental Status: She is alert and oriented to person, place, and time. Cranial Nerves: No cranial nerve deficit. Sensory: No sensory deficit. Motor: No weakness or abnormal muscle tone. Coordination: Coordination normal.      Gait: Gait normal.      Deep Tendon Reflexes: Reflexes normal.      Comments: pt has motor/ CV/ Sensation grossly intact to all extremities, R = L in strength;   Psychiatric:         Behavior: Behavior normal.         Thought Content: Thought content normal.          MDM       Procedures    Chief Complaint   Patient presents with    Chest Pain       10:28 PM  The patients presenting problems have been discussed, and they are in agreement with the care plan formulated and outlined with them. I have encouraged them to ask questions as they arise throughout their visit. MEDICATIONS GIVEN:  Medications - No data to display    LABS REVIEWED:  Labs Reviewed   CBC WITH AUTOMATED DIFF - Abnormal; Notable for the following components:       Result Value    WBC 14.3 (*)     RDW 15.0 (*)     NEUTROPHILS 84 (*)     LYMPHOCYTES 10 (*)     IMMATURE GRANULOCYTES 1 (*)     ABS. NEUTROPHILS 11.9 (*)     ABS. IMM. GRANS. 0.1 (*)     All other components within normal limits   METABOLIC PANEL, COMPREHENSIVE - Abnormal; Notable for the following components:    Glucose 106 (*)     Protein, total 8.8 (*)     Globulin 4.9 (*)     A-G Ratio 0.8 (*)     All other components within normal limits   TROPONIN I   SAMPLES BEING HELD       RADIOLOGY RESULTS:  The following have been ordered and reviewed:  _____________________________________________________________________  _____________________________________________________________________    EKG interpretation:   Rhythm: sinus tachycardia  rhythm; and regular . Rate (approx.): 104; Axis: normal; P wave: normal; QRS interval: normal ; ST/T wave: normal; Negative acute significant segmental elevations/     PROCEDURES:        CONSULTATIONS:       PROGRESS NOTES:      DIAGNOSIS:    1. Other chest pain    2. Mycobacterium avium complex colonization        PLAN:  1-MAC - pulmonologist follow-up  2 Chest Pain / neg ed evaluation - will have pt follow-up with Cardiology;     ED COURSE: The patients hospital course has been uncomplicated. Loki Sierra  results have been reviewed with her. She has been counseled regarding her diagnosis. She verbally conveys understanding and agreement of the signs, symptoms, diagnosis, treatment and prognosis and additionally agrees to Call/ Arrange follow up as recommended with Dr. Alex Connolly MD in 24 - 48 hours. She also agrees with the care-plan and conveys that all of her questions have been answered.   I have also put together some discharge instructions for her that include: 1) educational information regarding their diagnosis, 2) how to care for their diagnosis at home, as well a 3) list of reasons why they would want to return to the ED prior to their follow-up appointment, should their condition change or for concerns.

## 2021-06-07 NOTE — ED NOTES
MD  reviewed discharge instructions and options with patient. Patient verbalized understanding. RN reviewed discharge instructions using teach back method. Patient ambulatory to exit without difficulty and no acute signs of distress. No complaints or needs expressed at this time. Patient counseled on medications prescribed at discharge. Vital signs stable. Patient to follow up with cardiology/pulmonology/PCP in the morning for appointment.

## 2021-06-07 NOTE — ED TRIAGE NOTES
Triage: Pt arrives ambulatory from home with CC of chest pain that started earlier today when she was sending an email. She reports the pain is worsened by activity but is always present. She endorses some SOB but has chronic lung problems so she is always SOB.

## 2021-06-07 NOTE — DISCHARGE INSTRUCTIONS
Thank you for allowing us to provide you with medical care today. We realize that you have many choices for your emergency care needs. We thank you for choosing [facility].  Please choose us in the future for any continued health care needs. We hope we addressed all of your medical concerns. We strive to provide excellent quality care in the Emergency Department. Anything less than excellent does not meet our expectations. The exam and treatment you received in the Emergency Department were for an emergent problem and are not intended as complete care. It is important that you follow up with a doctor, nurse practitioner, or  [id number] assistant for ongoing care. If your symptoms worsen or you do not improve as expected and you are unable to reach your usual health care provider, you should return to the Emergency Department. We are available 24 hours a day. Take this sheet with you when you go to your follow-up visit. If you have any problem arranging the follow-up visit, contact the Emergency Department immediately. Make an appointment your family doctor for follow up of this visit. Return to the ER if you are unable to be seen in a timely manner.

## 2021-06-08 LAB
ATRIAL RATE: 104 BPM
CALCULATED P AXIS, ECG09: 71 DEGREES
CALCULATED R AXIS, ECG10: 2 DEGREES
CALCULATED T AXIS, ECG11: 50 DEGREES
DIAGNOSIS, 93000: NORMAL
P-R INTERVAL, ECG05: 222 MS
Q-T INTERVAL, ECG07: 350 MS
QRS DURATION, ECG06: 76 MS
QTC CALCULATION (BEZET), ECG08: 460 MS
VENTRICULAR RATE, ECG03: 104 BPM

## 2022-03-18 ENCOUNTER — HOSPITAL ENCOUNTER (OUTPATIENT)
Dept: GENERAL RADIOLOGY | Age: 72
Discharge: HOME | End: 2022-03-18
Attending: INTERNAL MEDICINE
Payer: MEDICARE

## 2022-03-18 ENCOUNTER — TRANSCRIBE ORDER (OUTPATIENT)
Dept: GENERAL RADIOLOGY | Age: 72
End: 2022-03-18

## 2022-03-18 DIAGNOSIS — J47.9 BRONCHIECTASIS WITHOUT COMPLICATION (HCC): ICD-10-CM

## 2022-03-18 DIAGNOSIS — J47.9 BRONCHIECTASIS WITHOUT COMPLICATION (HCC): Primary | ICD-10-CM

## 2022-03-18 PROCEDURE — 71046 X-RAY EXAM CHEST 2 VIEWS: CPT

## 2022-09-19 ENCOUNTER — HOSPITAL ENCOUNTER (OUTPATIENT)
Dept: GENERAL RADIOLOGY | Age: 72
Discharge: HOME | End: 2022-09-19
Attending: INTERNAL MEDICINE
Payer: MEDICARE

## 2022-09-19 ENCOUNTER — TRANSCRIBE ORDER (OUTPATIENT)
Dept: GENERAL RADIOLOGY | Age: 72
End: 2022-09-19

## 2022-09-19 DIAGNOSIS — J47.1 BRONCHIECTASIS WITH ACUTE EXACERBATION (HCC): Primary | ICD-10-CM

## 2022-09-19 DIAGNOSIS — J47.1 BRONCHIECTASIS WITH ACUTE EXACERBATION (HCC): ICD-10-CM

## 2022-09-19 PROCEDURE — 71046 X-RAY EXAM CHEST 2 VIEWS: CPT

## 2022-12-16 ENCOUNTER — TRANSCRIBE ORDER (OUTPATIENT)
Dept: GENERAL RADIOLOGY | Age: 72
End: 2022-12-16

## 2022-12-16 ENCOUNTER — HOSPITAL ENCOUNTER (OUTPATIENT)
Dept: GENERAL RADIOLOGY | Age: 72
Discharge: HOME | End: 2022-12-16
Attending: INTERNAL MEDICINE
Payer: MEDICARE

## 2022-12-16 DIAGNOSIS — J47.1 BRONCHIECTASIS WITH ACUTE EXACERBATION (HCC): Primary | ICD-10-CM

## 2022-12-16 DIAGNOSIS — J47.1 BRONCHIECTASIS WITH ACUTE EXACERBATION (HCC): ICD-10-CM

## 2022-12-16 PROCEDURE — 71046 X-RAY EXAM CHEST 2 VIEWS: CPT

## 2023-01-10 ENCOUNTER — HOSPITAL ENCOUNTER (OUTPATIENT)
Age: 73
Setting detail: OUTPATIENT SURGERY
Discharge: HOME | End: 2023-01-10
Attending: INTERNAL MEDICINE | Admitting: INTERNAL MEDICINE
Payer: MEDICARE

## 2023-01-10 VITALS
TEMPERATURE: 98.9 F | WEIGHT: 155 LBS | DIASTOLIC BLOOD PRESSURE: 57 MMHG | HEART RATE: 96 BPM | HEIGHT: 66 IN | SYSTOLIC BLOOD PRESSURE: 110 MMHG | BODY MASS INDEX: 24.91 KG/M2 | OXYGEN SATURATION: 91 % | RESPIRATION RATE: 19 BRPM

## 2023-01-10 LAB
APPEARANCE FLD: ABNORMAL
COLOR FLD: ABNORMAL
LYMPHOCYTES NFR FLD: 2 %
MONOS+MACROS NFR FLD: 10 %
NEUTROPHILS NFR FLD: 88 %
NUC CELL # FLD: 7760 /CU MM
RBC # FLD: >100 /CU MM
SPECIMEN SOURCE FLD: ABNORMAL

## 2023-01-10 PROCEDURE — 87529 HSV DNA AMP PROBE: CPT

## 2023-01-10 PROCEDURE — 88312 SPECIAL STAINS GROUP 1: CPT

## 2023-01-10 PROCEDURE — 74011000250 HC RX REV CODE- 250: Performed by: INTERNAL MEDICINE

## 2023-01-10 PROCEDURE — 87278 LEGION PNEUMOPHILIA AG IF: CPT

## 2023-01-10 PROCEDURE — 36415 COLL VENOUS BLD VENIPUNCTURE: CPT

## 2023-01-10 PROCEDURE — 87102 FUNGUS ISOLATION CULTURE: CPT

## 2023-01-10 PROCEDURE — 88112 CYTOPATH CELL ENHANCE TECH: CPT

## 2023-01-10 PROCEDURE — 87186 SC STD MICRODIL/AGAR DIL: CPT

## 2023-01-10 PROCEDURE — 87077 CULTURE AEROBIC IDENTIFY: CPT

## 2023-01-10 PROCEDURE — 2709999900 HC NON-CHARGEABLE SUPPLY: Performed by: INTERNAL MEDICINE

## 2023-01-10 PROCEDURE — 87070 CULTURE OTHR SPECIMN AEROBIC: CPT

## 2023-01-10 PROCEDURE — 87116 MYCOBACTERIA CULTURE: CPT

## 2023-01-10 PROCEDURE — 87252 VIRUS INOCULATION TISSUE: CPT

## 2023-01-10 PROCEDURE — 76040000019: Performed by: INTERNAL MEDICINE

## 2023-01-10 PROCEDURE — 89050 BODY FLUID CELL COUNT: CPT

## 2023-01-10 PROCEDURE — 74011250636 HC RX REV CODE- 250/636: Performed by: INTERNAL MEDICINE

## 2023-01-10 RX ORDER — LIDOCAINE HYDROCHLORIDE 20 MG/ML
INJECTION, SOLUTION INFILTRATION; PERINEURAL
Status: DISCONTINUED
Start: 2023-01-10 | End: 2023-01-10 | Stop reason: HOSPADM

## 2023-01-10 RX ORDER — SODIUM CHLORIDE 0.9 % (FLUSH) 0.9 %
5-40 SYRINGE (ML) INJECTION AS NEEDED
Status: DISCONTINUED | OUTPATIENT
Start: 2023-01-10 | End: 2023-01-10 | Stop reason: HOSPADM

## 2023-01-10 RX ORDER — SODIUM CHLORIDE 0.9 % (FLUSH) 0.9 %
5-40 SYRINGE (ML) INJECTION EVERY 8 HOURS
Status: DISCONTINUED | OUTPATIENT
Start: 2023-01-10 | End: 2023-01-10 | Stop reason: HOSPADM

## 2023-01-10 RX ORDER — IPRATROPIUM BROMIDE AND ALBUTEROL SULFATE 2.5; .5 MG/3ML; MG/3ML
3 SOLUTION RESPIRATORY (INHALATION)
Status: DISCONTINUED | OUTPATIENT
Start: 2023-01-10 | End: 2023-01-10 | Stop reason: HOSPADM

## 2023-01-10 RX ORDER — FENTANYL CITRATE 50 UG/ML
25 INJECTION, SOLUTION INTRAMUSCULAR; INTRAVENOUS
Status: DISCONTINUED | OUTPATIENT
Start: 2023-01-10 | End: 2023-01-10 | Stop reason: HOSPADM

## 2023-01-10 RX ORDER — MIDAZOLAM HYDROCHLORIDE 1 MG/ML
.25-5 INJECTION, SOLUTION INTRAMUSCULAR; INTRAVENOUS
Status: DISCONTINUED | OUTPATIENT
Start: 2023-01-10 | End: 2023-01-10 | Stop reason: HOSPADM

## 2023-01-10 RX ORDER — LIDOCAINE HYDROCHLORIDE 10 MG/ML
INJECTION INFILTRATION; PERINEURAL
Status: DISCONTINUED
Start: 2023-01-10 | End: 2023-01-10 | Stop reason: HOSPADM

## 2023-01-10 RX ORDER — LIDOCAINE HYDROCHLORIDE 10 MG/ML
INJECTION INFILTRATION; PERINEURAL AS NEEDED
Status: DISCONTINUED | OUTPATIENT
Start: 2023-01-10 | End: 2023-01-10 | Stop reason: HOSPADM

## 2023-01-10 RX ORDER — IPRATROPIUM BROMIDE AND ALBUTEROL SULFATE 2.5; .5 MG/3ML; MG/3ML
SOLUTION RESPIRATORY (INHALATION) AS NEEDED
Status: DISCONTINUED | OUTPATIENT
Start: 2023-01-10 | End: 2023-01-10 | Stop reason: HOSPADM

## 2023-01-10 RX ORDER — LIDOCAINE HYDROCHLORIDE 20 MG/ML
INJECTION, SOLUTION INFILTRATION; PERINEURAL AS NEEDED
Status: DISCONTINUED | OUTPATIENT
Start: 2023-01-10 | End: 2023-01-10 | Stop reason: HOSPADM

## 2023-01-10 NOTE — ROUTINE PROCESS
[facility]  1950  [id number]    Situation:  Verbal report received from: Otf Bautista RN  Procedure: Procedure(s):  BRONCHOSCOPY WITH BRONCHOALVEOLAR LAVAGE (NO BIOPSY) (ANES CONSCIOUS SEDATION)    Background:    Preoperative diagnosis: MYCOBACTERIUM AVIUM INTRACELLULASE COMPLEX  Postoperative diagnosis: 1. :  Dr. Camilo Parra  Assistant(s): Endoscopy RN-1: Sharron Shaw    Specimens: * No specimens in log *  H. Pylori  no    Assessment:  Intra-procedure medications   Versed  yes 3 mg  Fentanyl yes 50 mcg  Anesthesia gave intra-procedure sedation and medications, see anesthesia flow sheet no    Intravenous fluids: NS@ KVO     Vital signs stable     Abdominal assessment: round and soft     Recommendation:  Discharge patient per MD order.   Family or Friend Spouse in [address] [facility]  Permission to share finding with family or friend yes

## 2023-01-10 NOTE — PERIOP NOTES
Initial RN admission and assessment performed and documented in Endoscopy navigator. All procedural vital signs, airway assessment, and level of consciousness information monitored and recorded by anesthesia staff on the anesthesia record. Report received from RN. Patient transported to recovery area by Teresita Mata RN. Bronchoscopy scope  was pre-cleaned at bedside immediately following procedure by [address] [place], [place] .    1152- Duoneb given post procedure in PACU.

## 2023-01-10 NOTE — DISCHARGE INSTRUCTIONS
NAVIGATIONAL BRONCHOSCOPY / EBUS DISCHARGE INSTRUCTIONS    -Sore throat/coughing -- You may use throat lozenges, cough drops, or warm salt water gargle as needed. -Redness at IV site - apply a warm compress to area; if redness or soreness persist contact your physician's office.  -Gaseous discomfort- walking, belching will help relieve any discomfort.  -Do not operate a vehicle for 12 hours OR engage in an occupation involving machinery or appliances for rest of today.  -Do not drink alcoholic beverages for at least 12 hours. -You cannot sign any legal documents or make any critical decisions for at least 24 hour after anesthesia. -You may have some blood tinged phlegm - this should stop within 2-3 hours, call MD if the bleeding is more than 1/2 cup OR if bleeding persists after 24 hours. DIET  -Do not eat or drink anything until ***.  -You may resume your previous diet at ***. ACTIVITY  -You may resume your normal daily activities however it is recommended that you spend the remainder of the day resting -  avoid any strenuous activity. CALL [facility] 24/7 at [phone] with any signs of:     -Increasing pain, nausea, vomiting  -Abdominal distension/bloating/swelling  -Any swelling around your neck, upper chest, collarbone.  -New increased bleeding from mouth, nose or, rectum  -Fever with chills  -Pain in chest area  -New or worsening shortness of breath  -If sudden severe chest pain or shortness of breath, please call 911. MEDICATION  -No changes have been made to your medications. You may resume all your medications once able to eat/drink again. Restart *** blood thinner on ***.       Your Bronch Team today:                       Physician:    Dr Crews Record                                                    Nurse:    Radha Luna, RN    Respiratory Therapist:    Freedom Edwards

## 2023-01-10 NOTE — PROCEDURES
Pulmonary, Critical Care, and Sleep Medicine    Name: Olga Dumas MRN: [id number]   : 1950 Hospital: [facility]   Date: 1/10/2023        Bronchoscopy Report    Procedure: Diagnostic bronchoscopy. Indication: Abnormal chest imaging    Consent/Treatment: Informed consent was obtained from the  patient after risks, benefits and alternatives were explained. Timeout verified the correct patient and correct procedure. Anesthesia:   Topical sedation to nares, mouth, and tracheobronchial tree with lidocaine  Moderate sedation with Fentanyl 50 mcg mcg and Versed 3mg was used  Moderate (conscious) sedation was administered by the endoscopy nurse and supervised by the endoscopist.  The following parameters were monitored: oxygen saturation, heart rate, blood pressure, respiratory rate, EKG, adequacy of pulmonary ventilation, and response to care. Total physician intraservice time was 25 minutes. Procedure Details:   -- The bronchoscope was introduced orally with use of a bite block.    -- The vocal cords were found to be normal.  -- The trachea and ernestine were completely inspected and were found to be normal.  -- The right-sided endobronchial anatomy was completely inspected and was found to be normal.  -- The left-sided endobronchial anatomy was completely inspected and was found to be normal.   -- Tenacious mucopurulent secretions were noted in proximal airways and suctioned clear    Specimens:   Bronchial washings were sent for  microbiology, cytology, AFB smear and culture, and fungal culture  The bronchoscope was wedged in the medial segment of RML and superior segment of left lingula and bronchoalveolar lavage was performed; material was sent for  microbiology, cytology, AFB smear and culture, and fungal culture    Rapid On-Site Evaluation:  NA    Complications: none    Estimated Blood Loss: Minimal    Rossana Shea MD

## 2023-01-12 LAB
ACID FAST STN SPEC: NEGATIVE
L PNEUMO AG SPEC QL IF: NEGATIVE
MYCOBACTERIUM SPEC QL CULT: NORMAL
SPECIMEN PREPARATION: NORMAL
SPECIMEN SOURCE: NORMAL
VIRUS SPEC CULT: NORMAL

## 2023-01-14 LAB
BACTERIA SPEC CULT: ABNORMAL
GRAM STN SPEC: ABNORMAL
SERVICE CMNT-IMP: ABNORMAL
SERVICE CMNT-IMP: ABNORMAL

## 2023-01-15 LAB
HSV1 DNA SPEC QL NAA+PROBE: NORMAL
HSV2 DNA SPEC QL NAA+PROBE: NORMAL
SPECIMEN SOURCE: NORMAL

## 2023-09-22 ENCOUNTER — ANESTHESIA EVENT (OUTPATIENT)
Facility: HOSPITAL | Age: 73
End: 2023-09-22
Payer: MEDICARE

## 2023-09-22 ENCOUNTER — HOSPITAL ENCOUNTER (OUTPATIENT)
Facility: HOSPITAL | Age: 73
Setting detail: OUTPATIENT SURGERY
Discharge: HOME | End: 2023-09-22
Attending: INTERNAL MEDICINE | Admitting: INTERNAL MEDICINE
Payer: MEDICARE

## 2023-09-22 ENCOUNTER — ANESTHESIA (OUTPATIENT)
Facility: HOSPITAL | Age: 73
End: 2023-09-22
Payer: MEDICARE

## 2023-09-22 VITALS
SYSTOLIC BLOOD PRESSURE: 123 MMHG | DIASTOLIC BLOOD PRESSURE: 55 MMHG | RESPIRATION RATE: 31 BRPM | BODY MASS INDEX: 23.78 KG/M2 | WEIGHT: 148 LBS | HEART RATE: 91 BPM | OXYGEN SATURATION: 93 % | HEIGHT: 66 IN | TEMPERATURE: 98 F

## 2023-09-22 PROCEDURE — 7100000010 HC PHASE II RECOVERY - FIRST 15 MIN: Performed by: INTERNAL MEDICINE

## 2023-09-22 PROCEDURE — 7100000011 HC PHASE II RECOVERY - ADDTL 15 MIN: Performed by: INTERNAL MEDICINE

## 2023-09-22 PROCEDURE — 88305 TISSUE EXAM BY PATHOLOGIST: CPT

## 2023-09-22 PROCEDURE — 3600007502: Performed by: INTERNAL MEDICINE

## 2023-09-22 PROCEDURE — 2709999900 HC NON-CHARGEABLE SUPPLY: Performed by: INTERNAL MEDICINE

## 2023-09-22 PROCEDURE — 3700000001 HC ADD 15 MINUTES (ANESTHESIA): Performed by: INTERNAL MEDICINE

## 2023-09-22 PROCEDURE — 2500000003 HC RX 250 WO HCPCS: Performed by: NURSE ANESTHETIST, CERTIFIED REGISTERED

## 2023-09-22 PROCEDURE — 6360000002 HC RX W HCPCS: Performed by: NURSE ANESTHETIST, CERTIFIED REGISTERED

## 2023-09-22 PROCEDURE — 2580000003 HC RX 258: Performed by: INTERNAL MEDICINE

## 2023-09-22 PROCEDURE — 3600007512: Performed by: INTERNAL MEDICINE

## 2023-09-22 PROCEDURE — 3700000000 HC ANESTHESIA ATTENDED CARE: Performed by: INTERNAL MEDICINE

## 2023-09-22 RX ORDER — SODIUM CHLORIDE 0.9 % (FLUSH) 0.9 %
5-40 SYRINGE (ML) INJECTION EVERY 12 HOURS SCHEDULED
Status: DISCONTINUED | OUTPATIENT
Start: 2023-09-22 | End: 2023-09-22 | Stop reason: HOSPADM

## 2023-09-22 RX ORDER — LIDOCAINE HYDROCHLORIDE 20 MG/ML
INJECTION, SOLUTION EPIDURAL; INFILTRATION; INTRACAUDAL; PERINEURAL PRN
Status: DISCONTINUED | OUTPATIENT
Start: 2023-09-22 | End: 2023-09-22 | Stop reason: SDUPTHER

## 2023-09-22 RX ORDER — SODIUM CHLORIDE 9 MG/ML
INJECTION, SOLUTION INTRAVENOUS CONTINUOUS
Status: DISCONTINUED | OUTPATIENT
Start: 2023-09-22 | End: 2023-09-22 | Stop reason: HOSPADM

## 2023-09-22 RX ORDER — ETHAMBUTOL HYDROCHLORIDE 400 MG/1
TABLET, FILM COATED ORAL
COMMUNITY
Start: 2023-07-31

## 2023-09-22 RX ORDER — PROMETHAZINE HYDROCHLORIDE AND CODEINE PHOSPHATE 6.25; 1 MG/5ML; MG/5ML
SOLUTION ORAL
COMMUNITY
Start: 2023-09-10

## 2023-09-22 RX ORDER — SODIUM CHLORIDE 0.9 % (FLUSH) 0.9 %
5-40 SYRINGE (ML) INJECTION PRN
Status: DISCONTINUED | OUTPATIENT
Start: 2023-09-22 | End: 2023-09-22 | Stop reason: HOSPADM

## 2023-09-22 RX ORDER — AZITHROMYCIN 500 MG/1
TABLET, FILM COATED ORAL
COMMUNITY
Start: 2023-07-17

## 2023-09-22 RX ORDER — SODIUM CHLORIDE 9 MG/ML
25 INJECTION, SOLUTION INTRAVENOUS PRN
Status: DISCONTINUED | OUTPATIENT
Start: 2023-09-22 | End: 2023-09-22 | Stop reason: HOSPADM

## 2023-09-22 RX ADMIN — PROPOFOL 30 MG: 10 INJECTION, EMULSION INTRAVENOUS at 13:18

## 2023-09-22 RX ADMIN — PROPOFOL 30 MG: 10 INJECTION, EMULSION INTRAVENOUS at 13:20

## 2023-09-22 RX ADMIN — PROPOFOL 30 MG: 10 INJECTION, EMULSION INTRAVENOUS at 13:16

## 2023-09-22 RX ADMIN — PROPOFOL 40 MG: 10 INJECTION, EMULSION INTRAVENOUS at 13:12

## 2023-09-22 RX ADMIN — PROPOFOL 30 MG: 10 INJECTION, EMULSION INTRAVENOUS at 13:29

## 2023-09-22 RX ADMIN — PROPOFOL 30 MG: 10 INJECTION, EMULSION INTRAVENOUS at 13:26

## 2023-09-22 RX ADMIN — PROPOFOL 30 MG: 10 INJECTION, EMULSION INTRAVENOUS at 13:24

## 2023-09-22 RX ADMIN — SODIUM CHLORIDE: 9 INJECTION, SOLUTION INTRAVENOUS at 12:59

## 2023-09-22 RX ADMIN — PROPOFOL 20 MG: 10 INJECTION, EMULSION INTRAVENOUS at 13:41

## 2023-09-22 RX ADMIN — PROPOFOL 30 MG: 10 INJECTION, EMULSION INTRAVENOUS at 13:14

## 2023-09-22 RX ADMIN — PROPOFOL 80 MG: 10 INJECTION, EMULSION INTRAVENOUS at 13:10

## 2023-09-22 RX ADMIN — LIDOCAINE HYDROCHLORIDE 40 MG: 20 INJECTION, SOLUTION EPIDURAL; INFILTRATION; INTRACAUDAL; PERINEURAL at 13:10

## 2023-09-22 RX ADMIN — PROPOFOL 30 MG: 10 INJECTION, EMULSION INTRAVENOUS at 13:32

## 2023-09-22 RX ADMIN — PROPOFOL 30 MG: 10 INJECTION, EMULSION INTRAVENOUS at 13:38

## 2023-09-22 RX ADMIN — PROPOFOL 20 MG: 10 INJECTION, EMULSION INTRAVENOUS at 13:35

## 2023-09-22 RX ADMIN — PROPOFOL 30 MG: 10 INJECTION, EMULSION INTRAVENOUS at 13:22

## 2023-09-22 NOTE — OP NOTE
Radha Pickering M.D.  [address], [address] [facility]  [phone]               Esophagogastroduodenoscopy (EGD) Procedure Note    NAME: Stephanie Boyle  :  1950  MRN:  [id number]    Indications: GERD    : Azeem Salinas MD    Referring Provider:  Ned Lion MD    Staff: Circulator: Marcelina Cogan, RN  Endoscopy Technician: Memory Poet    Prosthetic devices, grafts, tissues, transplant, or devices implanted: none    Medicine:  MAC anesthesia      Procedure Details:  After informed consent was obtained with all risks and benefits of the procedure explained and preprocedure exam completed, the patient was placed in the left lateral decubitus position. Universal protocol for patient identification was performed and documented in the nursing notes. Throughout the procedure, the patient's blood pressure was monitored at least every five minutes; pulse, and oxygen saturations were monitored continuously. All vital signs were documented in the nursing notes. The endoscope was inserted into the mouth and advanced under direct vision to second portion of the duodenum. A careful inspection was made as the gastroscope was withdrawn, including a retroflexed view of the proximal stomach; findings and interventions are described below.       Findings:   Esophagus: normal  Stomach: 5 mm sessile polyp just below the GEJ s/p biopsies, mild linear erythema in the antrum s/p biopsies there and throughout the stomach, 6 cm hiatal hernia  Duodenum: normal    Interventions:    biopsy of stomach  whole    Specimens:   ID Type Source Tests Collected by Time Destination   A : gastric biopsy Tissue Gastric SURGICAL PATHOLOGY Robinson Euceda MD 2023 1314    B : gastric polyp Tissue Gastric SURGICAL PATHOLOGY Robinson Euceda MD 2023 1316    C : sigmoid colon polyp Tissue Sigmoid Colon SURGICAL PATHOLOGY Robinson Euceda MD 2023 1342         EBL: None

## 2023-09-22 NOTE — PROGRESS NOTES

## 2023-09-22 NOTE — H&P
[address] [facility], [address] [facility]          Pre-procedure History and Physical       NAME:  Rios Glover   :   1950   MRN:   [id number]     CHIEF COMPLAINT/HPI: gerd, crcs    PMH:  Past Medical History:   Diagnosis Date    Arrhythmia     fast HR    Arthritis     psoriatic    Asthma     Bronchiectasis ([address])     Esophageal spasm     GERD (gastroesophageal reflux disease)     GI bleed     Pneumonia , 2011       PSH:  Past Surgical History:   Procedure Laterality Date    CATARACT REMOVAL  2011    lens implant (L) eye/and right eye    COLONOSCOPY  10/23/12    Brand-int hemorrhoids,     GYN      laparoscopy to become pregnant (endometrosis)    PEDRO MAMMOGRAM DIGITAL BILATERAL      PAP SMEAR      UPPER GASTROINTESTINAL ENDOSCOPY      Brand       Allergies: Allergies   Allergen Reactions    Nitrofurantoin Hives    Nsaids Other (See Comments)     GI bleeding. Home Medications:  Prior to Admission Medications   Prescriptions Last Dose Informant Patient Reported? Taking? CLOFAZIMINE PO 2023  Yes Yes   HYDROcodone-chlorpheniramine (TUSSIONEX) 10-8 MG/5ML SUER   Yes No   Sig: Take 5 mLs by mouth.    azithromycin (ZITHROMAX) 500 MG tablet Past Week  Yes No   dilTIAZem (TIAZAC) 180 MG extended release capsule 2023  Yes No   Sig: Take 1 capsule by mouth daily   ethambutol (MYAMBUTOL) 400 MG tablet 2023  Yes No   ipratropium (ATROVENT) 0.02 % nebulizer solution 2023  Yes Yes   lansoprazole (PREVACID) 15 MG delayed release capsule   Yes No   Sig: Take 15 mg by mouth every morning (before breakfast)   levalbuterol (XOPENEX HFA) 45 MCG/ACT inhaler 2023  Yes No   Sig: Inhale 2 puffs into the lungs as needed   levoFLOXacin (LEVAQUIN) 750 MG tablet   Yes No   Sig: Take 750 mg by mouth daily   mometasone-formoterol (DULERA) 200-5 MCG/ACT inhaler   Yes No   Sig: Inhale 2 puffs into the lungs 2 times daily   montelukast (SINGULAIR) 10 MG

## 2023-09-22 NOTE — ANESTHESIA PRE PROCEDURE
Department of Anesthesiology  Preprocedure Note       Name:  Felisha Yang   Age:  72 y.o.  :  1950                                          MRN:  [id number]         Date:  2023      Surgeon: Rodger Abreu): Kathryn Orellana MD    Procedure: Procedure(s):  EGD, COLONOSCOPY  COLONOSCOPY WITH BIOPSY    Medications prior to admission:   Prior to Admission medications    Medication Sig Start Date End Date Taking?  Authorizing Provider   CLOFAZIMINE PO  23  Yes Historical Provider, MD   ipratropium (ATROVENT) 0.02 % nebulizer solution  23  Yes Historical Provider, MD   tiotropium-olodaterol (Anna Eh) 2.5-2.5 MCG/ACT AERS  23  Yes Historical Provider, MD   azithromycin (ZITHROMAX) 500 MG tablet  23   Historical Provider, MD   ethambutol (MYAMBUTOL) 400 MG tablet  23   Historical Provider, MD   promethazine-codeine ([address]) 6.25-10 MG/5ML SOLN solution  9/10/23   Historical Provider, MD [facility]) 180 MG extended release capsule Take 1 capsule by mouth daily 13   Ar Automatic Reconciliation   HYDROcodone-chlorpheniramine (TUSSIONEX) 10-8 MG/5ML SUER Take 5 mLs by mouth. 17   Ar Automatic Reconciliation   lansoprazole (PREVACID) 15 MG delayed release capsule Take 15 mg by mouth every morning (before breakfast)    Ar Automatic Reconciliation   levalbuterol (XOPENEX HFA) 45 MCG/ACT inhaler Inhale 2 puffs into the lungs as needed    Ar Automatic Reconciliation   levoFLOXacin (LEVAQUIN) 750 MG tablet Take 750 mg by mouth daily 17   Ar Automatic Reconciliation   mometasone-formoterol (DULERA) 200-5 MCG/ACT inhaler Inhale 2 puffs into the lungs 2 times daily    Ar Automatic Reconciliation   montelukast (SINGULAIR) 10 MG tablet Take 10 mg by mouth daily    Ar Automatic Reconciliation   sertraline (ZOLOFT) 100 MG tablet Take 1.5 tablets by mouth daily 13   Ar Automatic Reconciliation       Current medications:    Current Facility-Administered

## 2023-09-22 NOTE — OP NOTE
Radha Lowe M.D.  Nikko Siddiqui, [address] [facility]  [phone]               Colonoscopy Procedure Note    NAME: Todd Richards  :  1950  MRN:  [id number]    Indications:   Screening colonoscopy     : Caty Mckeon MD    Referring Provider:  Melinda Amor MD    Staff: Circulator: To Knutson RN  Endoscopy Technician: Tita Brady    Prosthetic devices, grafts, tissues, transplant, or devices implanted: none    Medicines:  MAC anesthesia      Procedure Details:  After informed consent was obtained with all risks and benefits of the procedure explained and preprocedure exam completed, the patient was placed in the left lateral decubitus position. Universal protocol for patient identification was performed and documented in the nursing notes. Throughout the procedure, the patient's blood pressure was monitored at least every five minutes; pulse, and oxygen saturations were monitored continuously. All vital signs were documented in the nursing notes. A digital rectal exam was performed and was normal.  The Olympus videocolonoscope  was inserted in the rectum and carefully advanced to the cecum, which was identified by the ileocecal valve and appendiceal orifice. The colonoscope was slowly withdrawn with careful evaluation between folds. Retroflexion in the rectum was performed; findings and interventions are described below. Procedure start time, extent reached time/cecum time, and procedure end time are documented in the nursing notes. The quality of preparation was adequate.        Findings:   2 mm sessile polyp in the sigmoid colon s/p cold forceps polypectomy  Tortuous sigmoid colon  Moderate internal hemorrhoids    Interventions:    1 complete polypectomy were performed using cold biopsy forceps and the polyps were  retrieved    Specimens:   ID Type Source Tests Collected by Time Destination   A : gastric biopsy Tissue Gastric SURGICAL Melton Spurling, MD 9/22/2023 1314    B : gastric polyp Tissue Gastric SURGICAL PATHOLOGY Tamika Hensley MD 9/22/2023 1316    C : sigmoid colon polyp Tissue Sigmoid Colon SURGICAL PATHOLOGY Tamika Hensley MD 9/22/2023 1342        EBL:  None. Complications:   No immediate complications     Impression:  -See post-procedure diagnoses. Recommendations:   -Await pathology.   -No further colonoscopies unless clinical indication based on age and significant pulmonary co-morbidities  Resume normal medication(s). Signed by:  Duglas Stephens MD          9/22/2023  1:53 PM

## 2023-09-22 NOTE — ANESTHESIA POSTPROCEDURE EVALUATION
Department of Anesthesiology  Postprocedure Note    Patient: Paramjit Gonsalves  MRN: [id number]  YOB: 1950  Date of evaluation: 9/22/2023      Procedure Summary     Date: 09/22/23 Room / Location: [facility] ENDO  / [facility] ENDOSCOPY    Anesthesia Start: 1308 Anesthesia Stop: 1347    Procedures:       EGD, COLONOSCOPY      COLONOSCOPY WITH BIOPSY Diagnosis:       Personal history of colonic polyps      Gastroesophageal reflux disease, unspecified whether esophagitis present      (Personal history of colonic polyps [Z86.010])      (Gastroesophageal reflux disease, unspecified whether esophagitis present [K21.9])    Surgeons:  Chepe Silva MD Responsible Provider: Makenna Pruitt DO    Anesthesia Type: MAC ASA Status: 3          Anesthesia Type: MAC    Clara Phase I: Clara Score: 10    Clara Phase II: Clara Score: 10      Anesthesia Post Evaluation    Patient location during evaluation: PACU  Level of consciousness: awake  Airway patency: patent  Nausea & Vomiting: no nausea  Complications: no  Cardiovascular status: hemodynamically stable  Respiratory status: acceptable  Hydration status: stable  Multimodal analgesia pain management approach  Pain management: adequate

## 2023-12-31 ENCOUNTER — HOSPITAL ENCOUNTER (EMERGENCY)
Facility: HOSPITAL | Age: 73
Discharge: HOME | End: 2023-12-31
Attending: STUDENT IN AN ORGANIZED HEALTH CARE EDUCATION/TRAINING PROGRAM
Payer: MEDICARE

## 2023-12-31 ENCOUNTER — APPOINTMENT (OUTPATIENT)
Facility: HOSPITAL | Age: 73
End: 2023-12-31
Payer: MEDICARE

## 2023-12-31 VITALS
DIASTOLIC BLOOD PRESSURE: 68 MMHG | RESPIRATION RATE: 18 BRPM | SYSTOLIC BLOOD PRESSURE: 120 MMHG | HEART RATE: 90 BPM | OXYGEN SATURATION: 89 % | TEMPERATURE: 98 F

## 2023-12-31 DIAGNOSIS — S22.000A COMPRESSION FRACTURE OF THORACIC VERTEBRA, UNSPECIFIED THORACIC VERTEBRAL LEVEL, INITIAL ENCOUNTER (HCC): Primary | ICD-10-CM

## 2023-12-31 PROCEDURE — 6370000000 HC RX 637 (ALT 250 FOR IP): Performed by: PHYSICIAN ASSISTANT

## 2023-12-31 PROCEDURE — 99284 EMERGENCY DEPT VISIT MOD MDM: CPT

## 2023-12-31 PROCEDURE — 72128 CT CHEST SPINE W/O DYE: CPT

## 2023-12-31 PROCEDURE — 72070 X-RAY EXAM THORAC SPINE 2VWS: CPT

## 2023-12-31 RX ORDER — OXYCODONE HYDROCHLORIDE AND ACETAMINOPHEN 5; 325 MG/1; MG/1
1 TABLET ORAL
Status: COMPLETED | OUTPATIENT
Start: 2023-12-31 | End: 2023-12-31

## 2023-12-31 RX ORDER — PREDNISONE 20 MG/1
50 TABLET ORAL
Status: COMPLETED | OUTPATIENT
Start: 2023-12-31 | End: 2023-12-31

## 2023-12-31 RX ORDER — CYCLOBENZAPRINE HCL 10 MG
10 TABLET ORAL ONCE
Status: COMPLETED | OUTPATIENT
Start: 2023-12-31 | End: 2023-12-31

## 2023-12-31 RX ORDER — CYCLOBENZAPRINE HCL 10 MG
10 TABLET ORAL 3 TIMES DAILY PRN
Qty: 15 TABLET | Refills: 0 | Status: SHIPPED | OUTPATIENT
Start: 2023-12-31

## 2023-12-31 RX ORDER — METHYLPREDNISOLONE 4 MG/1
TABLET ORAL
Qty: 1 KIT | Refills: 0 | Status: SHIPPED | OUTPATIENT
Start: 2023-12-31 | End: 2024-01-06

## 2023-12-31 RX ORDER — LIDOCAINE 4 G/G
1 PATCH TOPICAL
Status: DISCONTINUED | OUTPATIENT
Start: 2023-12-31 | End: 2024-01-01 | Stop reason: HOSPADM

## 2023-12-31 RX ORDER — OXYCODONE HYDROCHLORIDE AND ACETAMINOPHEN 5; 325 MG/1; MG/1
1 TABLET ORAL EVERY 6 HOURS PRN
Qty: 12 TABLET | Refills: 0 | Status: SHIPPED | OUTPATIENT
Start: 2023-12-31 | End: 2024-01-03

## 2023-12-31 RX ADMIN — PREDNISONE 50 MG: 20 TABLET ORAL at 22:38

## 2023-12-31 RX ADMIN — OXYCODONE HYDROCHLORIDE AND ACETAMINOPHEN 1 TABLET: 5; 325 TABLET ORAL at 22:38

## 2023-12-31 RX ADMIN — CYCLOBENZAPRINE 10 MG: 10 TABLET, FILM COATED ORAL at 22:38

## 2023-12-31 ASSESSMENT — PAIN DESCRIPTION - ORIENTATION
ORIENTATION: MID;POSTERIOR
ORIENTATION: LOWER

## 2023-12-31 ASSESSMENT — PAIN SCALES - GENERAL
PAINLEVEL_OUTOF10: 9
PAINLEVEL_OUTOF10: 9

## 2023-12-31 ASSESSMENT — PAIN DESCRIPTION - DESCRIPTORS: DESCRIPTORS: ACHING

## 2023-12-31 ASSESSMENT — PAIN - FUNCTIONAL ASSESSMENT: PAIN_FUNCTIONAL_ASSESSMENT: 0-10

## 2023-12-31 ASSESSMENT — PAIN DESCRIPTION - LOCATION
LOCATION: BACK
LOCATION: BACK

## 2024-01-01 NOTE — ED TRIAGE NOTES
Pt arrives to triage w/ complaint of mid back pain xseveral days. Pt was adjusting self in bed when pain started. Denies other trauma or injury.

## 2024-01-01 NOTE — ED PROVIDER NOTES
[facility] EMERGENCY DEP  EMERGENCY DEPARTMENT ENCOUNTER      Pt Name: Autumn Manjarrez  MRN: [id number]  Birthdate 1950  Date of evaluation: 12/31/2023  Provider: SILVERIO Espinoza    CHIEF COMPLAINT       Chief Complaint   Patient presents with    Back Pain         HISTORY OF PRESENT ILLNESS   (Location/Symptom, Timing/Onset, Context/Setting, Quality, Duration, Modifying Factors, Severity)  Note limiting factors.   73-year-old female presenting to the ED for back pain.  \"I have wrenched my back,\" notes that she tried to readjust herself in bed.  Happened Friday night, 2 days ago.  Pt reports pain in the midback, radiate all the way across.  Explicitly worse with bending, twisting, reaching.  No fall or blunt rauma.  No radaition into the legs.  Patient denies weakness or numbness in the legs, urinary retention, incontinence of bowel or bladder, perineal numbness, fever.   Has been taking Tylenol at home with some relief of pain.  Patient denies chest pain, shortness of breath, is chronically on O2 and denies any recent increase in cough, shortness of breath, oxygen demand, etc., reports that she feels completely at her baseline other than the pain specifically in her back.    Past medical history: MAC pneumonia, asthma, GERD, bronchiectasis  Social history: .  Non-smoker.    The history is provided by the patient and the spouse.         Review of External Medical Records:     Nursing Notes were reviewed.    REVIEW OF SYSTEMS    (2-9 systems for level 4, 10 or more for level 5)     Review of Systems   Musculoskeletal:  Positive for back pain.   All other systems reviewed and are negative.      Except as noted above the remainder of the review of systems was reviewed and negative.       PAST MEDICAL HISTORY     Past Medical History:   Diagnosis Date    Arrhythmia     fast HR    Arthritis     psoriatic    Asthma     Bronchiectasis (HCC)     Esophageal spasm     GERD (gastroesophageal reflux disease)

## 2024-01-01 NOTE — DISCHARGE INSTRUCTIONS
Return to the ED for new or worsening symptoms.  Use medications as directed.  Make a follow-up appointment with your primary care and the neurosurgeon for additional evaluation.

## 2024-01-01 NOTE — ED NOTES
Patient received discharge instructions and verbalized understanding. She left via wheelchair in no acute distress.

## 2024-02-21 ENCOUNTER — TRANSCRIBE ORDERS (OUTPATIENT)
Facility: HOSPITAL | Age: 74
End: 2024-02-21

## 2024-02-21 DIAGNOSIS — M81.0 AGE-RELATED OSTEOPOROSIS WITHOUT CURRENT PATHOLOGICAL FRACTURE: Primary | ICD-10-CM

## 2024-02-22 ENCOUNTER — HOSPITAL ENCOUNTER (OUTPATIENT)
Facility: HOSPITAL | Age: 74
Discharge: HOME | End: 2024-02-22
Attending: RADIOLOGY
Payer: MEDICARE

## 2024-02-22 DIAGNOSIS — M81.0 AGE-RELATED OSTEOPOROSIS WITHOUT CURRENT PATHOLOGICAL FRACTURE: ICD-10-CM

## 2024-02-22 PROCEDURE — 72146 MRI CHEST SPINE W/O DYE: CPT

## 2024-03-06 ENCOUNTER — HOSPITAL ENCOUNTER (OUTPATIENT)
Facility: HOSPITAL | Age: 74
Discharge: HOME | End: 2024-03-06
Attending: RADIOLOGY | Admitting: RADIOLOGY
Payer: MEDICARE

## 2024-03-06 VITALS
RESPIRATION RATE: 23 BRPM | DIASTOLIC BLOOD PRESSURE: 43 MMHG | HEIGHT: 64 IN | TEMPERATURE: 97.9 F | SYSTOLIC BLOOD PRESSURE: 101 MMHG | HEART RATE: 75 BPM | BODY MASS INDEX: 22.2 KG/M2 | WEIGHT: 130 LBS | OXYGEN SATURATION: 97 %

## 2024-03-06 DIAGNOSIS — M80.08XA AGE-RELATED OSTEOPOROSIS WITH CURRENT PATHOLOGICAL FRACTURE OF VERTEBRA, INITIAL ENCOUNTER (HCC): ICD-10-CM

## 2024-03-06 PROCEDURE — 2580000003 HC RX 258: Performed by: RADIOLOGY

## 2024-03-06 PROCEDURE — 99153 MOD SED SAME PHYS/QHP EA: CPT

## 2024-03-06 PROCEDURE — 2500000003 HC RX 250 WO HCPCS: Performed by: RADIOLOGY

## 2024-03-06 PROCEDURE — 6360000002 HC RX W HCPCS: Performed by: RADIOLOGY

## 2024-03-06 RX ORDER — LIDOCAINE HYDROCHLORIDE 10 MG/ML
INJECTION, SOLUTION EPIDURAL; INFILTRATION; INTRACAUDAL; PERINEURAL PRN
Status: COMPLETED | OUTPATIENT
Start: 2024-03-06 | End: 2024-03-06

## 2024-03-06 RX ORDER — BUPIVACAINE HYDROCHLORIDE 5 MG/ML
INJECTION, SOLUTION EPIDURAL; INTRACAUDAL PRN
Status: COMPLETED | OUTPATIENT
Start: 2024-03-06 | End: 2024-03-06

## 2024-03-06 RX ORDER — SODIUM CHLORIDE 9 MG/ML
INJECTION, SOLUTION INTRAVENOUS CONTINUOUS PRN
Status: COMPLETED | OUTPATIENT
Start: 2024-03-06 | End: 2024-03-06

## 2024-03-06 RX ORDER — KETOROLAC TROMETHAMINE 30 MG/ML
INJECTION, SOLUTION INTRAMUSCULAR; INTRAVENOUS PRN
Status: COMPLETED | OUTPATIENT
Start: 2024-03-06 | End: 2024-03-06

## 2024-03-06 RX ORDER — FENTANYL CITRATE 50 UG/ML
INJECTION, SOLUTION INTRAMUSCULAR; INTRAVENOUS PRN
Status: COMPLETED | OUTPATIENT
Start: 2024-03-06 | End: 2024-03-06

## 2024-03-06 RX ORDER — MIDAZOLAM HYDROCHLORIDE 2 MG/2ML
INJECTION, SOLUTION INTRAMUSCULAR; INTRAVENOUS PRN
Status: COMPLETED | OUTPATIENT
Start: 2024-03-06 | End: 2024-03-06

## 2024-03-06 RX ORDER — DIPHENHYDRAMINE HYDROCHLORIDE 50 MG/ML
INJECTION INTRAMUSCULAR; INTRAVENOUS PRN
Status: COMPLETED | OUTPATIENT
Start: 2024-03-06 | End: 2024-03-06

## 2024-03-06 RX ADMIN — BUPIVACAINE HYDROCHLORIDE 4 ML: 5 INJECTION, SOLUTION EPIDURAL; INTRACAUDAL; PERINEURAL at 11:21

## 2024-03-06 RX ADMIN — MIDAZOLAM HYDROCHLORIDE 1.5 MG: 1 INJECTION, SOLUTION INTRAMUSCULAR; INTRAVENOUS at 11:16

## 2024-03-06 RX ADMIN — MIDAZOLAM HYDROCHLORIDE 0.5 MG: 1 INJECTION, SOLUTION INTRAMUSCULAR; INTRAVENOUS at 11:07

## 2024-03-06 RX ADMIN — FENTANYL CITRATE 75 MCG: 50 INJECTION, SOLUTION INTRAMUSCULAR; INTRAVENOUS at 11:02

## 2024-03-06 RX ADMIN — LIDOCAINE HYDROCHLORIDE 3 ML: 10 INJECTION, SOLUTION EPIDURAL; INFILTRATION; INTRACAUDAL; PERINEURAL at 11:21

## 2024-03-06 RX ADMIN — FENTANYL CITRATE 50 MCG: 50 INJECTION, SOLUTION INTRAMUSCULAR; INTRAVENOUS at 11:15

## 2024-03-06 RX ADMIN — KETOROLAC TROMETHAMINE 15 MG: 30 INJECTION, SOLUTION INTRAMUSCULAR at 10:11

## 2024-03-06 RX ADMIN — FENTANYL CITRATE 25 MCG: 50 INJECTION, SOLUTION INTRAMUSCULAR; INTRAVENOUS at 11:12

## 2024-03-06 RX ADMIN — DIPHENHYDRAMINE HYDROCHLORIDE 25 MG: 50 INJECTION, SOLUTION INTRAMUSCULAR; INTRAVENOUS at 10:11

## 2024-03-06 RX ADMIN — WATER 2000 MG: 1 INJECTION INTRAMUSCULAR; INTRAVENOUS; SUBCUTANEOUS at 10:11

## 2024-03-06 RX ADMIN — MIDAZOLAM HYDROCHLORIDE 2 MG: 1 INJECTION, SOLUTION INTRAMUSCULAR; INTRAVENOUS at 11:02

## 2024-03-06 RX ADMIN — SODIUM CHLORIDE 125 ML/HR: 9 INJECTION, SOLUTION INTRAVENOUS at 11:00

## 2024-03-06 ASSESSMENT — PULMONARY FUNCTION TESTS
PIF_VALUE: 0

## 2024-03-06 ASSESSMENT — PAIN - FUNCTIONAL ASSESSMENT
PAIN_FUNCTIONAL_ASSESSMENT: 0-10
PAIN_FUNCTIONAL_ASSESSMENT: PREVENTS OR INTERFERES SOME ACTIVE ACTIVITIES AND ADLS

## 2024-03-06 ASSESSMENT — PAIN DESCRIPTION - DESCRIPTORS: DESCRIPTORS: SPASM;SHARP

## 2024-03-06 NOTE — H&P
Interventional and Vascular Radiology History and Physical    Patient: Autumn Manjarrez 73 y.o. female       Chief Complaint: No chief complaint on file.      History of Present Illness: T8 fracture     History:    Past Medical History:   Diagnosis Date    Arrhythmia     fast HR    Arthritis     psoriatic    Asthma     Bronchiectasis (HCC)     Esophageal spasm     GERD (gastroesophageal reflux disease)     GI bleed     Pneumonia , 2011     Family History   Problem Relation Age of Onset    Hypertension Mother     Hypertension Father     Osteoarthritis Father      Social History     Socioeconomic History    Marital status:      Spouse name: Not on file    Number of children: Not on file    Years of education: Not on file    Highest education level: Not on file   Occupational History    Not on file   Tobacco Use    Smoking status: Former     Current packs/day: 0.00     Types: Cigarettes     Quit date: 1978     Years since quittin.2    Smokeless tobacco: Never   Substance and Sexual Activity    Alcohol use: Yes     Alcohol/week: 1.7 standard drinks of alcohol    Drug use: Not on file    Sexual activity: Not on file   Other Topics Concern    Not on file   Social History Narrative    Not on file     Social Determinants of Health     Financial Resource Strain: Not on file   Food Insecurity: Not on file   Transportation Needs: Not on file   Physical Activity: Not on file   Stress: Not on file   Social Connections: Not on file   Intimate Partner Violence: Not on file   Housing Stability: Not on file       Allergies:   Allergies   Allergen Reactions    Nitrofurantoin Hives    Nsaids Other (See Comments)     GI bleeding.       Current Medications:  No current facility-administered medications for this encounter.        Physical Exam:  Blood pressure (!) 101/43, pulse 75, temperature 97.9 °F (36.6 °C), temperature source Oral, resp. rate 23, height 1.626 m (5' 4\"), weight 59 kg (130 lb), SpO2 97

## 2024-03-06 NOTE — PROGRESS NOTES
Pt arrives via wheelchair to angio department accompanied by  for T8 Kypho procedure. All assessments completed and consent was reviewed.  Education given was regarding procedure, moderate sedation, post-procedure care and  management/follow-up. Opportunity for questions was provided and all questions and concerns were addressed.     [Headache] : no headache [Dizziness] : no dizziness [Negative] : Respiratory [FreeTextEntry2] : weight is stable

## 2024-03-15 ENCOUNTER — TRANSCRIBE ORDERS (OUTPATIENT)
Facility: HOSPITAL | Age: 74
End: 2024-03-15

## 2024-03-15 DIAGNOSIS — M81.0 AGE-RELATED OSTEOPOROSIS WITHOUT CURRENT PATHOLOGICAL FRACTURE: Primary | ICD-10-CM

## 2024-03-18 ENCOUNTER — HOSPITAL ENCOUNTER (OUTPATIENT)
Age: 74
Discharge: HOME | End: 2024-03-21
Attending: RADIOLOGY
Payer: MEDICARE

## 2024-03-18 DIAGNOSIS — M81.0 AGE-RELATED OSTEOPOROSIS WITHOUT CURRENT PATHOLOGICAL FRACTURE: ICD-10-CM

## 2024-03-18 PROCEDURE — 72131 CT LUMBAR SPINE W/O DYE: CPT

## 2024-03-18 PROCEDURE — 72128 CT CHEST SPINE W/O DYE: CPT

## 2024-03-21 ENCOUNTER — TRANSCRIBE ORDERS (OUTPATIENT)
Facility: HOSPITAL | Age: 74
End: 2024-03-21

## 2024-03-21 DIAGNOSIS — M81.0 AGE-RELATED OSTEOPOROSIS WITHOUT CURRENT PATHOLOGICAL FRACTURE: Primary | ICD-10-CM

## 2024-04-04 ENCOUNTER — HOSPITAL ENCOUNTER (OUTPATIENT)
Age: 74
Discharge: HOME | End: 2024-04-04
Payer: MEDICARE

## 2024-04-04 DIAGNOSIS — M81.0 AGE-RELATED OSTEOPOROSIS WITHOUT CURRENT PATHOLOGICAL FRACTURE: ICD-10-CM

## 2024-04-04 PROCEDURE — 72148 MRI LUMBAR SPINE W/O DYE: CPT

## 2024-07-18 ENCOUNTER — HOSPITAL ENCOUNTER (INPATIENT)
Facility: HOSPITAL | Age: 74
LOS: 3 days | Discharge: HOME | DRG: 871 | End: 2024-07-21
Attending: EMERGENCY MEDICINE | Admitting: STUDENT IN AN ORGANIZED HEALTH CARE EDUCATION/TRAINING PROGRAM
Payer: MEDICARE

## 2024-07-18 ENCOUNTER — APPOINTMENT (OUTPATIENT)
Facility: HOSPITAL | Age: 74
DRG: 871 | End: 2024-07-18
Payer: MEDICARE

## 2024-07-18 DIAGNOSIS — J18.9 PNEUMONIA OF BOTH LUNGS DUE TO INFECTIOUS ORGANISM, UNSPECIFIED PART OF LUNG: ICD-10-CM

## 2024-07-18 DIAGNOSIS — R00.0 TACHYCARDIA: ICD-10-CM

## 2024-07-18 DIAGNOSIS — R50.9 FEBRILE ILLNESS: Primary | ICD-10-CM

## 2024-07-18 PROBLEM — J15.9 COMMUNITY ACQUIRED BACTERIAL PNEUMONIA: Status: ACTIVE | Noted: 2024-07-18

## 2024-07-18 LAB
ALBUMIN SERPL-MCNC: 3.4 G/DL (ref 3.5–5)
ALBUMIN/GLOB SERPL: 0.7 (ref 1.1–2.2)
ALP SERPL-CCNC: 106 U/L (ref 45–117)
ALT SERPL-CCNC: 29 U/L (ref 12–78)
ANION GAP SERPL CALC-SCNC: 4 MMOL/L (ref 5–15)
APPEARANCE UR: CLEAR
AST SERPL-CCNC: 31 U/L (ref 15–37)
BACTERIA URNS QL MICRO: NEGATIVE /HPF
BASOPHILS # BLD: 0 K/UL (ref 0–0.1)
BASOPHILS NFR BLD: 0 % (ref 0–1)
BILIRUB SERPL-MCNC: 0.3 MG/DL (ref 0.2–1)
BILIRUB UR QL CFM: NEGATIVE
BUN SERPL-MCNC: 11 MG/DL (ref 6–20)
BUN/CREAT SERPL: 12 (ref 12–20)
CALCIUM SERPL-MCNC: 9.5 MG/DL (ref 8.5–10.1)
CHLORIDE SERPL-SCNC: 103 MMOL/L (ref 97–108)
CO2 SERPL-SCNC: 28 MMOL/L (ref 21–32)
COLOR UR: ABNORMAL
COMMENT:: NORMAL
CREAT SERPL-MCNC: 0.93 MG/DL (ref 0.55–1.02)
DIFFERENTIAL METHOD BLD: ABNORMAL
EKG ATRIAL RATE: 129 BPM
EKG DIAGNOSIS: NORMAL
EKG P AXIS: 50 DEGREES
EKG P-R INTERVAL: 158 MS
EKG Q-T INTERVAL: 276 MS
EKG QRS DURATION: 74 MS
EKG QTC CALCULATION (BAZETT): 404 MS
EKG R AXIS: -68 DEGREES
EKG T AXIS: 25 DEGREES
EKG VENTRICULAR RATE: 129 BPM
EOSINOPHIL # BLD: 0.1 K/UL (ref 0–0.4)
EOSINOPHIL NFR BLD: 1 % (ref 0–7)
EPITH CASTS URNS QL MICRO: ABNORMAL /LPF
ERYTHROCYTE [DISTWIDTH] IN BLOOD BY AUTOMATED COUNT: 14.5 % (ref 11.5–14.5)
FLUAV RNA SPEC QL NAA+PROBE: NOT DETECTED
FLUBV RNA SPEC QL NAA+PROBE: NOT DETECTED
GLOBULIN SER CALC-MCNC: 5.2 G/DL (ref 2–4)
GLUCOSE SERPL-MCNC: 125 MG/DL (ref 65–100)
GLUCOSE UR STRIP.AUTO-MCNC: NEGATIVE MG/DL
HCT VFR BLD AUTO: 33.8 % (ref 35–47)
HGB BLD-MCNC: 11.3 G/DL (ref 11.5–16)
HGB UR QL STRIP: NEGATIVE
HYALINE CASTS URNS QL MICRO: ABNORMAL /LPF (ref 0–5)
IMM GRANULOCYTES # BLD AUTO: 0 K/UL (ref 0–0.04)
IMM GRANULOCYTES NFR BLD AUTO: 0 % (ref 0–0.5)
KETONES UR QL STRIP.AUTO: ABNORMAL MG/DL
LACTATE SERPL-SCNC: 0.9 MMOL/L (ref 0.4–2)
LEUKOCYTE ESTERASE UR QL STRIP.AUTO: NEGATIVE
LYMPHOCYTES # BLD: 1.3 K/UL (ref 0.8–3.5)
LYMPHOCYTES NFR BLD: 13 % (ref 12–49)
MCH RBC QN AUTO: 31 PG (ref 26–34)
MCHC RBC AUTO-ENTMCNC: 33.4 G/DL (ref 30–36.5)
MCV RBC AUTO: 92.6 FL (ref 80–99)
MONOCYTES # BLD: 0.7 K/UL (ref 0–1)
MONOCYTES NFR BLD: 7 % (ref 5–13)
NEUTS SEG # BLD: 8 K/UL (ref 1.8–8)
NEUTS SEG NFR BLD: 79 % (ref 32–75)
NITRITE UR QL STRIP.AUTO: NEGATIVE
NRBC # BLD: 0 K/UL (ref 0–0.01)
NRBC BLD-RTO: 0 PER 100 WBC
PH UR STRIP: 5.5 (ref 5–8)
PLATELET # BLD AUTO: 286 K/UL (ref 150–400)
PMV BLD AUTO: 9.6 FL (ref 8.9–12.9)
POTASSIUM SERPL-SCNC: 3.6 MMOL/L (ref 3.5–5.1)
PROT SERPL-MCNC: 8.6 G/DL (ref 6.4–8.2)
PROT UR STRIP-MCNC: 30 MG/DL
RBC # BLD AUTO: 3.65 M/UL (ref 3.8–5.2)
RBC #/AREA URNS HPF: ABNORMAL /HPF (ref 0–5)
SARS-COV-2 RNA RESP QL NAA+PROBE: NOT DETECTED
SODIUM SERPL-SCNC: 135 MMOL/L (ref 136–145)
SP GR UR REFRACTOMETRY: 1.03
SPECIMEN HOLD: NORMAL
UROBILINOGEN UR QL STRIP.AUTO: 1 EU/DL (ref 0.2–1)
WBC # BLD AUTO: 10.2 K/UL (ref 3.6–11)
WBC URNS QL MICRO: ABNORMAL /HPF (ref 0–4)

## 2024-07-18 PROCEDURE — 2580000003 HC RX 258: Performed by: STUDENT IN AN ORGANIZED HEALTH CARE EDUCATION/TRAINING PROGRAM

## 2024-07-18 PROCEDURE — 93005 ELECTROCARDIOGRAM TRACING: CPT | Performed by: EMERGENCY MEDICINE

## 2024-07-18 PROCEDURE — 99285 EMERGENCY DEPT VISIT HI MDM: CPT

## 2024-07-18 PROCEDURE — 85025 COMPLETE CBC W/AUTO DIFF WBC: CPT

## 2024-07-18 PROCEDURE — 6370000000 HC RX 637 (ALT 250 FOR IP): Performed by: EMERGENCY MEDICINE

## 2024-07-18 PROCEDURE — 6370000000 HC RX 637 (ALT 250 FOR IP): Performed by: STUDENT IN AN ORGANIZED HEALTH CARE EDUCATION/TRAINING PROGRAM

## 2024-07-18 PROCEDURE — 87205 SMEAR GRAM STAIN: CPT

## 2024-07-18 PROCEDURE — 2580000003 HC RX 258: Performed by: EMERGENCY MEDICINE

## 2024-07-18 PROCEDURE — 2060000000 HC ICU INTERMEDIATE R&B

## 2024-07-18 PROCEDURE — 81001 URINALYSIS AUTO W/SCOPE: CPT

## 2024-07-18 PROCEDURE — 83605 ASSAY OF LACTIC ACID: CPT

## 2024-07-18 PROCEDURE — 6360000002 HC RX W HCPCS: Performed by: STUDENT IN AN ORGANIZED HEALTH CARE EDUCATION/TRAINING PROGRAM

## 2024-07-18 PROCEDURE — 2500000003 HC RX 250 WO HCPCS: Performed by: STUDENT IN AN ORGANIZED HEALTH CARE EDUCATION/TRAINING PROGRAM

## 2024-07-18 PROCEDURE — 87040 BLOOD CULTURE FOR BACTERIA: CPT

## 2024-07-18 PROCEDURE — 71260 CT THORAX DX C+: CPT

## 2024-07-18 PROCEDURE — 87070 CULTURE OTHR SPECIMN AEROBIC: CPT

## 2024-07-18 PROCEDURE — 36415 COLL VENOUS BLD VENIPUNCTURE: CPT

## 2024-07-18 PROCEDURE — 96360 HYDRATION IV INFUSION INIT: CPT

## 2024-07-18 PROCEDURE — 71045 X-RAY EXAM CHEST 1 VIEW: CPT

## 2024-07-18 PROCEDURE — 80053 COMPREHEN METABOLIC PANEL: CPT

## 2024-07-18 PROCEDURE — 6360000004 HC RX CONTRAST MEDICATION

## 2024-07-18 PROCEDURE — 87636 SARSCOV2 & INF A&B AMP PRB: CPT

## 2024-07-18 RX ORDER — ONDANSETRON 2 MG/ML
4 INJECTION INTRAMUSCULAR; INTRAVENOUS EVERY 6 HOURS PRN
Status: DISCONTINUED | OUTPATIENT
Start: 2024-07-18 | End: 2024-07-21 | Stop reason: HOSPADM

## 2024-07-18 RX ORDER — SODIUM CHLORIDE 9 MG/ML
INJECTION, SOLUTION INTRAVENOUS PRN
Status: DISCONTINUED | OUTPATIENT
Start: 2024-07-18 | End: 2024-07-21 | Stop reason: HOSPADM

## 2024-07-18 RX ORDER — 0.9 % SODIUM CHLORIDE 0.9 %
1000 INTRAVENOUS SOLUTION INTRAVENOUS ONCE
Status: COMPLETED | OUTPATIENT
Start: 2024-07-18 | End: 2024-07-18

## 2024-07-18 RX ORDER — PROMETHAZINE HYDROCHLORIDE AND CODEINE PHOSPHATE 6.25; 1 MG/5ML; MG/5ML
5 SOLUTION ORAL EVERY 4 HOURS PRN
Status: DISCONTINUED | OUTPATIENT
Start: 2024-07-18 | End: 2024-07-21 | Stop reason: HOSPADM

## 2024-07-18 RX ORDER — MONTELUKAST SODIUM 10 MG/1
10 TABLET ORAL DAILY
Status: DISCONTINUED | OUTPATIENT
Start: 2024-07-18 | End: 2024-07-21 | Stop reason: HOSPADM

## 2024-07-18 RX ORDER — POTASSIUM CHLORIDE 7.45 MG/ML
10 INJECTION INTRAVENOUS PRN
Status: DISCONTINUED | OUTPATIENT
Start: 2024-07-18 | End: 2024-07-21 | Stop reason: HOSPADM

## 2024-07-18 RX ORDER — LEVOFLOXACIN 5 MG/ML
250 INJECTION, SOLUTION INTRAVENOUS EVERY 24 HOURS
Status: DISCONTINUED | OUTPATIENT
Start: 2024-07-18 | End: 2024-07-18

## 2024-07-18 RX ORDER — DILTIAZEM HYDROCHLORIDE 180 MG/1
180 CAPSULE, COATED, EXTENDED RELEASE ORAL DAILY
Status: DISCONTINUED | OUTPATIENT
Start: 2024-07-18 | End: 2024-07-19

## 2024-07-18 RX ORDER — POLYETHYLENE GLYCOL 3350 17 G/17G
17 POWDER, FOR SOLUTION ORAL DAILY PRN
Status: DISCONTINUED | OUTPATIENT
Start: 2024-07-18 | End: 2024-07-21 | Stop reason: HOSPADM

## 2024-07-18 RX ORDER — SODIUM CHLORIDE 0.9 % (FLUSH) 0.9 %
5-40 SYRINGE (ML) INJECTION PRN
Status: DISCONTINUED | OUTPATIENT
Start: 2024-07-18 | End: 2024-07-21 | Stop reason: HOSPADM

## 2024-07-18 RX ORDER — LEVOFLOXACIN 5 MG/ML
750 INJECTION, SOLUTION INTRAVENOUS
Status: DISCONTINUED | OUTPATIENT
Start: 2024-07-18 | End: 2024-07-18 | Stop reason: SDUPTHER

## 2024-07-18 RX ORDER — ENOXAPARIN SODIUM 100 MG/ML
40 INJECTION SUBCUTANEOUS DAILY
Status: DISCONTINUED | OUTPATIENT
Start: 2024-07-18 | End: 2024-07-21 | Stop reason: HOSPADM

## 2024-07-18 RX ORDER — LEVOFLOXACIN 5 MG/ML
500 INJECTION, SOLUTION INTRAVENOUS
Status: DISCONTINUED | OUTPATIENT
Start: 2024-07-18 | End: 2024-07-18

## 2024-07-18 RX ORDER — IPRATROPIUM BROMIDE AND ALBUTEROL SULFATE 2.5; .5 MG/3ML; MG/3ML
1 SOLUTION RESPIRATORY (INHALATION)
Status: DISCONTINUED | OUTPATIENT
Start: 2024-07-18 | End: 2024-07-19

## 2024-07-18 RX ORDER — SODIUM CHLORIDE FOR INHALATION 0.9 %
3 VIAL, NEBULIZER (ML) INHALATION PRN
Status: DISCONTINUED | OUTPATIENT
Start: 2024-07-18 | End: 2024-07-21 | Stop reason: HOSPADM

## 2024-07-18 RX ORDER — ACETAMINOPHEN 325 MG/1
650 TABLET ORAL ONCE
Status: COMPLETED | OUTPATIENT
Start: 2024-07-18 | End: 2024-07-18

## 2024-07-18 RX ORDER — ACETAMINOPHEN 650 MG/1
650 SUPPOSITORY RECTAL EVERY 6 HOURS PRN
Status: DISCONTINUED | OUTPATIENT
Start: 2024-07-18 | End: 2024-07-21 | Stop reason: HOSPADM

## 2024-07-18 RX ORDER — ONDANSETRON 4 MG/1
4 TABLET, ORALLY DISINTEGRATING ORAL EVERY 8 HOURS PRN
Status: DISCONTINUED | OUTPATIENT
Start: 2024-07-18 | End: 2024-07-21 | Stop reason: HOSPADM

## 2024-07-18 RX ORDER — DILTIAZEM HYDROCHLORIDE 180 MG/1
180 CAPSULE, EXTENDED RELEASE ORAL DAILY
Status: DISCONTINUED | OUTPATIENT
Start: 2024-07-18 | End: 2024-07-18 | Stop reason: SDUPTHER

## 2024-07-18 RX ORDER — SODIUM CHLORIDE 0.9 % (FLUSH) 0.9 %
5-40 SYRINGE (ML) INJECTION EVERY 12 HOURS SCHEDULED
Status: DISCONTINUED | OUTPATIENT
Start: 2024-07-18 | End: 2024-07-21 | Stop reason: HOSPADM

## 2024-07-18 RX ORDER — PANTOPRAZOLE SODIUM 20 MG/1
20 TABLET, DELAYED RELEASE ORAL
Status: DISCONTINUED | OUTPATIENT
Start: 2024-07-18 | End: 2024-07-21 | Stop reason: HOSPADM

## 2024-07-18 RX ORDER — ACETAMINOPHEN 325 MG/1
650 TABLET ORAL EVERY 6 HOURS PRN
Status: DISCONTINUED | OUTPATIENT
Start: 2024-07-18 | End: 2024-07-21 | Stop reason: HOSPADM

## 2024-07-18 RX ORDER — POTASSIUM CHLORIDE 750 MG/1
40 TABLET, FILM COATED, EXTENDED RELEASE ORAL PRN
Status: DISCONTINUED | OUTPATIENT
Start: 2024-07-18 | End: 2024-07-21 | Stop reason: HOSPADM

## 2024-07-18 RX ORDER — MAGNESIUM SULFATE IN WATER 40 MG/ML
2000 INJECTION, SOLUTION INTRAVENOUS PRN
Status: DISCONTINUED | OUTPATIENT
Start: 2024-07-18 | End: 2024-07-21 | Stop reason: HOSPADM

## 2024-07-18 RX ADMIN — SODIUM CHLORIDE 1000 ML: 9 INJECTION, SOLUTION INTRAVENOUS at 16:05

## 2024-07-18 RX ADMIN — IOPAMIDOL 75 ML: 612 INJECTION, SOLUTION INTRAVENOUS at 20:33

## 2024-07-18 RX ADMIN — DOXYCYCLINE 100 MG: 100 INJECTION, POWDER, LYOPHILIZED, FOR SOLUTION INTRAVENOUS at 21:04

## 2024-07-18 RX ADMIN — WATER 2000 MG: 1 INJECTION INTRAMUSCULAR; INTRAVENOUS; SUBCUTANEOUS at 21:02

## 2024-07-18 RX ADMIN — WATER 80 MG: 1 INJECTION INTRAMUSCULAR; INTRAVENOUS; SUBCUTANEOUS at 21:18

## 2024-07-18 RX ADMIN — ACETAMINOPHEN 650 MG: 325 TABLET ORAL at 21:00

## 2024-07-18 RX ADMIN — Medication 10 ML: at 20:53

## 2024-07-18 RX ADMIN — ONDANSETRON 4 MG: 2 INJECTION INTRAMUSCULAR; INTRAVENOUS at 21:22

## 2024-07-18 RX ADMIN — DILTIAZEM HYDROCHLORIDE 180 MG: 180 CAPSULE, COATED, EXTENDED RELEASE ORAL at 21:00

## 2024-07-18 RX ADMIN — SERTRALINE HYDROCHLORIDE 150 MG: 50 TABLET ORAL at 21:00

## 2024-07-18 ASSESSMENT — PAIN - FUNCTIONAL ASSESSMENT: PAIN_FUNCTIONAL_ASSESSMENT: NONE - DENIES PAIN

## 2024-07-18 NOTE — ED PROVIDER NOTES
[facility] EMERGENCY DEP  EMERGENCY DEPARTMENT ENCOUNTER      Pt Name: Autumn Manjarrez  MRN: [id number]  Birthdate 1950  Date of evaluation: 7/18/2024  Provider: Carly Pringle MD    CHIEF COMPLAINT       Chief Complaint   Patient presents with    Fever         HISTORY OF PRESENT ILLNESS    Autumn Manjarrez is a 75 yo F with h/o Bronchiectasis  on home O2 and h/o ESBL infection in sputum who  has been on Keflex for UTI for the past week and has continued to have low grade fevers.  She spoke with the nurse for her ID physician and was advised to come to the ED.          Additional history from independent historians:     Review of External Medical Records:     Nursing Notes were reviewed.    REVIEW OF SYSTEMS       Review of Systems   Constitutional:  Positive for fever.       Except as noted above the remainder of the review of systems was reviewed and negative.       PAST MEDICAL HISTORY     Past Medical History:   Diagnosis Date    Arrhythmia     fast HR    Arthritis     psoriatic    Asthma     Bronchiectasis (HCC)     Esophageal spasm     GERD (gastroesophageal reflux disease)     GI bleed     Pneumonia 1988, 6/2011         SURGICAL HISTORY       Past Surgical History:   Procedure Laterality Date    CATARACT REMOVAL  1/2011    lens implant (L) eye/and right eye    COLONOSCOPY  10/23/12    Brand-int hemorrhoids,     COLONOSCOPY N/A 9/22/2023    COLONOSCOPY WITH BIOPSY performed by Edward Mackenzie MD at [facility] ENDOSCOPY    GYN  1985    laparoscopy to become pregnant (endometrosis)    IR KYPHOPLASTY THORACIC FIRST LEVEL  3/6/2024    IR KYPHOPLASTY THORACIC FIRST LEVEL 3/6/2024 [facility] RAD ANGIO IR    PEDRO MAMMOGRAM DIGITAL BILATERAL  2/13    PAP SMEAR  8/12    UPPER GASTROINTESTINAL ENDOSCOPY      Brand    UPPER GASTROINTESTINAL ENDOSCOPY N/A 9/22/2023    EGD, COLONOSCOPY performed by Edward Mackenzie MD at [facility] ENDOSCOPY         CURRENT MEDICATIONS       Previous Medications    AZITHROMYCIN (ZITHROMAX) 500 MG TABLET           COMPREHENSIVE METABOLIC PANEL - Abnormal; Notable for the following components:    Sodium 135 (*)     Anion Gap 4 (*)     Glucose 125 (*)     Total Protein 8.6 (*)     Albumin 3.4 (*)     Globulin 5.2 (*)     Albumin/Globulin Ratio 0.7 (*)     All other components within normal limits   URINALYSIS WITH MICROSCOPIC - Abnormal; Notable for the following components:    Protein, UA 30 (*)     Ketones, Urine TRACE (*)     All other components within normal limits   CULTURE, BLOOD 1   CULTURE, BLOOD 2   COVID-19 & INFLUENZA COMBO   CULTURE, RESPIRATORY   LACTIC ACID   EXTRA TUBES HOLD   BILIRUBIN, CONFIRMATORY   PROCALCITONIN       All other labs were within normal range or not returned as of this dictation.    EMERGENCY DEPARTMENT COURSE and DIFFERENTIAL DIAGNOSIS/MDM:   Vitals:    Vitals:    07/18/24 1545 07/18/24 1600 07/18/24 1645 07/18/24 1845   BP: 137/74 (!) 140/71 (!) 152/83    Pulse: (!) 124 (!) 117 (!) 106 (!) 113   Resp: 17 28 16 (!) 32   Temp:    98.2 °F (36.8 °C)   TempSrc:    Oral   SpO2: 93% 94% 98% 96%   Weight:       Height:               Medical Decision Making  Amount and/or Complexity of Data Reviewed  Labs: ordered.  Radiology: ordered.    Risk  OTC drugs.  Prescription drug management.  Decision regarding hospitalization.            REASSESSMENT     ED Course as of 07/18/24 1805   Thu Jul 18, 2024   1607 EDMD EKG interpretation: There is a sinus tachycardia at 129 bpm.  Leftward axis is noted.  No ectopy or acute ischemia is noted. [RP]      ED Course User Index  [RP] Eliecer Pollock MD     7:54 PM  Called back to patient's room.  She does not wish to be admitted.  She states that her Heart Rate is always elevated (usually around 110) but she forgot to take her diltazem today.  She states that levaquin usually works for her but also states that her ID doctor said she would need IV antibiotics.      8:28 PM  After further discussion, patient agrees to stay.  Hospitalist updated.      CRITICAL CARE  TIME       CONSULTS:  IP CONSULT TO HOSPITALIST  IP CONSULT TO PULMONOLOGY    PROCEDURES:  Unless otherwise noted below, none     Procedures        FINAL IMPRESSION      1. Febrile illness    2. Tachycardia    3. Pneumonia of both lungs due to infectious organism, unspecified part of lung          DISPOSITION/PLAN   DISPOSITION      Perfect Serve Consult for Admission  6:08 PM    ED Room Number: ER23/23  Patient Name and age:  Autumn Manjarrez 74 y.o.  female  Working Diagnosis:   1. Febrile illness    2. Tachycardia    3. Pneumonia of both lungs due to infectious organism, unspecified part of lung        COVID-19 Suspicion: No  Sepsis present:  No  Reassessment needed: No  Code Status:  Full Code  Readmission: No  Isolation Requirements: no  Recommended Level of Care: telemetry  Department: [facility] Adult ED - [phone]  Consulting Provider: Irving    Other:  h/o Bronchiectasis, recently on keflex for UTI but has h/o ESBL.  Has had increased cough and low grade fevers at home.  CXR here with pleural thickening in left base and diffuse interstitial prominence.   on arrival, Lactic acid 0.9.  Receving 1L NS and levaquin.      PATIENT REFERRED TO:  No follow-up provider specified.    DISCHARGE MEDICATIONS:  New Prescriptions    No medications on file         (Please note that portions of this note were completed with a voice recognition program.  Efforts were made to edit the dictations but occasionally words are mis-transcribed.)    Carly Pringle MD (electronically signed)  Emergency Attending Physician            Carly Pringle MD  07/18/24 2027

## 2024-07-18 NOTE — H&P
History & Physical    Primary Care Provider: Akosua Phillips MD  Source of Information: Patient and chart review    History of Presenting Illness:   Autumn Manjarrez is a 74 y.o. female with hx of bronchiectasis w/ chronic resp failure on home o2, asthma, psoriatric arthritis, gerd, recent uti on keflex who presented to ed with complaints of persistent fever. Reports malaise, fatigue, coug, mild dyspnea and low grade fevers over the last 5 days. Recently completed abx for uti and denies any symptoms. Referred to ed by outpatient ID.  The patient denies any fever, chills, chest or abdominal pain, nausea, vomiting, cough, congestion, recent illness, palpitations, or dysuria.    Remarkable vitals on ER Presentation: hr to 128, rr to 32  Labs Remarkable for: unremarkable  ER Images: cxr: Unchanged bronchiectasis with areas of pleural thickening in the left base. There are numerous nodules throughout the lungs which appear to have progressed.  Diffuse interstitial prominence has increased in the interval and may related to  infectious or inflammatory disease versus edema.  ER Rx: 1l ns bolus     Review of Systems:  Pertinent items are noted in the History of Present Illness.     Past Medical History:   Diagnosis Date    Arrhythmia     fast HR    Arthritis     psoriatic    Asthma     Bronchiectasis (HCC)     Esophageal spasm     GERD (gastroesophageal reflux disease)     GI bleed     Pneumonia 1988, 6/2011      Past Surgical History:   Procedure Laterality Date    CATARACT REMOVAL  1/2011    lens implant (L) eye/and right eye    COLONOSCOPY  10/23/12    Brand-int hemorrhoids,     COLONOSCOPY N/A 9/22/2023    COLONOSCOPY WITH BIOPSY performed by Edward Mackenzie MD at [facility] ENDOSCOPY    GYN  1985    laparoscopy to become pregnant (endometrosis)    IR KYPHOPLASTY THORACIC FIRST LEVEL  3/6/2024    IR KYPHOPLASTY THORACIC FIRST LEVEL 3/6/2024 [facility] RAD ANGIO IR    PEDRO MAMMOGRAM DIGITAL BILATERAL  2/13    PAP SMEAR  8/12    UPPER  Total Protein 8.6 (H) 6.4 - 8.2 g/dL    Albumin 3.4 (L) 3.5 - 5.0 g/dL    Globulin 5.2 (H) 2.0 - 4.0 g/dL    Albumin/Globulin Ratio 0.7 (L) 1.1 - 2.2     Blood Culture 1    Collection Time: 07/18/24  3:18 PM    Specimen: Blood   Result Value Ref Range    Special Requests NO SPECIAL REQUESTS      Culture NO GROWTH <24 HRS     Blood Culture 2    Collection Time: 07/18/24  3:18 PM    Specimen: Blood   Result Value Ref Range    Special Requests NO SPECIAL REQUESTS      Culture NO GROWTH <24 HRS     Lactic Acid    Collection Time: 07/18/24  3:18 PM   Result Value Ref Range    Lactic Acid, Plasma 0.9 0.4 - 2.0 MMOL/L   Extra Tubes Hold    Collection Time: 07/18/24  3:18 PM   Result Value Ref Range    Specimen HOld 1RED, 1BLUE     Comment:        Add-on orders for these samples will be processed based on acceptable specimen integrity and analyte stability, which may vary by analyte.   Urinalysis with Microscopic    Collection Time: 07/18/24  3:58 PM   Result Value Ref Range    Color, UA DARK YELLOW      Appearance CLEAR CLEAR      Specific Gravity, UA 1.029      pH, Urine 5.5 5.0 - 8.0      Protein, UA 30 (A) NEG mg/dL    Glucose, Ur Negative NEG mg/dL    Ketones, Urine TRACE (A) NEG mg/dL    Blood, Urine Negative NEG      Urobilinogen, Urine 1.0 0.2 - 1.0 EU/dL    Nitrite, Urine Negative NEG      Leukocyte Esterase, Urine Negative NEG      WBC, UA 0-4 0 - 4 /hpf    RBC, UA 0-5 0 - 5 /hpf    Epithelial Cells, UA FEW FEW /lpf    BACTERIA, URINE Negative NEG /hpf    Hyaline Casts, UA 0-2 0 - 5 /lpf   COVID-19 & Influenza Combo    Collection Time: 07/18/24  3:58 PM    Specimen: Nasopharyngeal   Result Value Ref Range    SARS-CoV-2, PCR Not detected NOTD      Rapid Influenza A By PCR Not detected NOTD      Rapid Influenza B By PCR Not detected NOTD     Bilirubin, Confirmatory    Collection Time: 07/18/24  3:58 PM   Result Value Ref Range    Bilirubin Confirmation, UA Negative NEG           Imaging:     Assessment:     Autumn

## 2024-07-18 NOTE — ED TRIAGE NOTES
Pt comes to ED from home with reports of a lowgrade fever x 5 days. Last tylenol was 90 minutes PTA. States she has been on an antibiotic per her pulmonologist without relief. Pt states her [facility] ID MD referred her to the ED.     Pt is on oxygen 3lpm via [place] at baseline.     BILL Contreras assessing pt in triage.

## 2024-07-19 LAB
ALBUMIN SERPL-MCNC: 2.6 G/DL (ref 3.5–5)
ALBUMIN/GLOB SERPL: 0.7 (ref 1.1–2.2)
ALP SERPL-CCNC: 80 U/L (ref 45–117)
ALT SERPL-CCNC: 25 U/L (ref 12–78)
ANION GAP SERPL CALC-SCNC: 7 MMOL/L (ref 5–15)
AST SERPL-CCNC: 20 U/L (ref 15–37)
BASOPHILS # BLD: 0 K/UL (ref 0–0.1)
BASOPHILS NFR BLD: 0 % (ref 0–1)
BILIRUB SERPL-MCNC: 0.3 MG/DL (ref 0.2–1)
BUN SERPL-MCNC: 11 MG/DL (ref 6–20)
BUN/CREAT SERPL: 21 (ref 12–20)
CALCIUM SERPL-MCNC: 8.7 MG/DL (ref 8.5–10.1)
CHLORIDE SERPL-SCNC: 109 MMOL/L (ref 97–108)
CO2 SERPL-SCNC: 25 MMOL/L (ref 21–32)
CREAT SERPL-MCNC: 0.52 MG/DL (ref 0.55–1.02)
DIFFERENTIAL METHOD BLD: ABNORMAL
EOSINOPHIL # BLD: 0 K/UL (ref 0–0.4)
EOSINOPHIL NFR BLD: 0 % (ref 0–7)
ERYTHROCYTE [DISTWIDTH] IN BLOOD BY AUTOMATED COUNT: 14.3 % (ref 11.5–14.5)
GLOBULIN SER CALC-MCNC: 3.8 G/DL (ref 2–4)
GLUCOSE SERPL-MCNC: 147 MG/DL (ref 65–100)
HCT VFR BLD AUTO: 30.9 % (ref 35–47)
HGB BLD-MCNC: 10.2 G/DL (ref 11.5–16)
IMM GRANULOCYTES # BLD AUTO: 0 K/UL
IMM GRANULOCYTES NFR BLD AUTO: 0 %
LYMPHOCYTES # BLD: 0.5 K/UL (ref 0.8–3.5)
LYMPHOCYTES NFR BLD: 13 % (ref 12–49)
MCH RBC QN AUTO: 30.6 PG (ref 26–34)
MCHC RBC AUTO-ENTMCNC: 33 G/DL (ref 30–36.5)
MCV RBC AUTO: 92.8 FL (ref 80–99)
MONOCYTES # BLD: 0 K/UL (ref 0–1)
MONOCYTES NFR BLD: 1 % (ref 5–13)
NEUTS SEG # BLD: 3.4 K/UL (ref 1.8–8)
NEUTS SEG NFR BLD: 86 % (ref 32–75)
NRBC # BLD: 0 K/UL (ref 0–0.01)
NRBC BLD-RTO: 0 PER 100 WBC
PLATELET # BLD AUTO: 221 K/UL (ref 150–400)
PMV BLD AUTO: 9.6 FL (ref 8.9–12.9)
POTASSIUM SERPL-SCNC: 4.5 MMOL/L (ref 3.5–5.1)
PROT SERPL-MCNC: 6.4 G/DL (ref 6.4–8.2)
RBC # BLD AUTO: 3.33 M/UL (ref 3.8–5.2)
RBC MORPH BLD: ABNORMAL
SODIUM SERPL-SCNC: 141 MMOL/L (ref 136–145)
WBC # BLD AUTO: 3.9 K/UL (ref 3.6–11)

## 2024-07-19 PROCEDURE — 2500000003 HC RX 250 WO HCPCS: Performed by: STUDENT IN AN ORGANIZED HEALTH CARE EDUCATION/TRAINING PROGRAM

## 2024-07-19 PROCEDURE — 97530 THERAPEUTIC ACTIVITIES: CPT

## 2024-07-19 PROCEDURE — 97161 PT EVAL LOW COMPLEX 20 MIN: CPT

## 2024-07-19 PROCEDURE — 6370000000 HC RX 637 (ALT 250 FOR IP): Performed by: PHYSICIAN ASSISTANT

## 2024-07-19 PROCEDURE — 6360000002 HC RX W HCPCS: Performed by: PHYSICIAN ASSISTANT

## 2024-07-19 PROCEDURE — 2580000003 HC RX 258: Performed by: PHYSICIAN ASSISTANT

## 2024-07-19 PROCEDURE — 97165 OT EVAL LOW COMPLEX 30 MIN: CPT

## 2024-07-19 PROCEDURE — 6370000000 HC RX 637 (ALT 250 FOR IP): Performed by: HOSPITALIST

## 2024-07-19 PROCEDURE — 6370000000 HC RX 637 (ALT 250 FOR IP): Performed by: STUDENT IN AN ORGANIZED HEALTH CARE EDUCATION/TRAINING PROGRAM

## 2024-07-19 PROCEDURE — 6360000002 HC RX W HCPCS: Performed by: HOSPITALIST

## 2024-07-19 PROCEDURE — 94760 N-INVAS EAR/PLS OXIMETRY 1: CPT

## 2024-07-19 PROCEDURE — 97116 GAIT TRAINING THERAPY: CPT

## 2024-07-19 PROCEDURE — 6360000002 HC RX W HCPCS: Performed by: STUDENT IN AN ORGANIZED HEALTH CARE EDUCATION/TRAINING PROGRAM

## 2024-07-19 PROCEDURE — 2580000003 HC RX 258: Performed by: HOSPITALIST

## 2024-07-19 PROCEDURE — 94640 AIRWAY INHALATION TREATMENT: CPT

## 2024-07-19 PROCEDURE — 2060000000 HC ICU INTERMEDIATE R&B

## 2024-07-19 PROCEDURE — 2700000000 HC OXYGEN THERAPY PER DAY

## 2024-07-19 PROCEDURE — 2580000003 HC RX 258: Performed by: STUDENT IN AN ORGANIZED HEALTH CARE EDUCATION/TRAINING PROGRAM

## 2024-07-19 PROCEDURE — 80053 COMPREHEN METABOLIC PANEL: CPT

## 2024-07-19 PROCEDURE — 87086 URINE CULTURE/COLONY COUNT: CPT

## 2024-07-19 PROCEDURE — 36415 COLL VENOUS BLD VENIPUNCTURE: CPT

## 2024-07-19 PROCEDURE — 85025 COMPLETE CBC W/AUTO DIFF WBC: CPT

## 2024-07-19 RX ORDER — DILTIAZEM HYDROCHLORIDE 120 MG/1
240 CAPSULE, COATED, EXTENDED RELEASE ORAL DAILY
Status: DISCONTINUED | OUTPATIENT
Start: 2024-07-19 | End: 2024-07-21 | Stop reason: HOSPADM

## 2024-07-19 RX ORDER — ARFORMOTEROL TARTRATE 15 UG/2ML
15 SOLUTION RESPIRATORY (INHALATION)
Status: DISCONTINUED | OUTPATIENT
Start: 2024-07-19 | End: 2024-07-19

## 2024-07-19 RX ORDER — ZOLPIDEM TARTRATE 5 MG/1
5 TABLET ORAL NIGHTLY PRN
Status: DISCONTINUED | OUTPATIENT
Start: 2024-07-19 | End: 2024-07-21 | Stop reason: HOSPADM

## 2024-07-19 RX ORDER — ARFORMOTEROL TARTRATE 15 UG/2ML
15 SOLUTION RESPIRATORY (INHALATION)
Status: DISCONTINUED | OUTPATIENT
Start: 2024-07-19 | End: 2024-07-21 | Stop reason: HOSPADM

## 2024-07-19 RX ORDER — GUAIFENESIN 600 MG/1
600 TABLET, EXTENDED RELEASE ORAL 2 TIMES DAILY
Status: DISCONTINUED | OUTPATIENT
Start: 2024-07-19 | End: 2024-07-21 | Stop reason: HOSPADM

## 2024-07-19 RX ADMIN — DOXYCYCLINE 100 MG: 100 INJECTION, POWDER, LYOPHILIZED, FOR SOLUTION INTRAVENOUS at 23:00

## 2024-07-19 RX ADMIN — ARFORMOTEROL TARTRATE: 15 SOLUTION RESPIRATORY (INHALATION) at 20:10

## 2024-07-19 RX ADMIN — CEFEPIME 2000 MG: 2 INJECTION, POWDER, FOR SOLUTION INTRAVENOUS at 09:12

## 2024-07-19 RX ADMIN — ARFORMOTEROL TARTRATE: 15 SOLUTION RESPIRATORY (INHALATION) at 08:47

## 2024-07-19 RX ADMIN — DILTIAZEM HYDROCHLORIDE 180 MG: 180 CAPSULE, COATED, EXTENDED RELEASE ORAL at 09:10

## 2024-07-19 RX ADMIN — IPRATROPIUM BROMIDE 0.5 MG: 0.5 SOLUTION RESPIRATORY (INHALATION) at 20:10

## 2024-07-19 RX ADMIN — IPRATROPIUM BROMIDE 0.5 MG: 0.5 SOLUTION RESPIRATORY (INHALATION) at 08:48

## 2024-07-19 RX ADMIN — GUAIFENESIN 600 MG: 600 TABLET, EXTENDED RELEASE ORAL at 20:10

## 2024-07-19 RX ADMIN — CEFEPIME 2000 MG: 2 INJECTION, POWDER, FOR SOLUTION INTRAVENOUS at 17:55

## 2024-07-19 RX ADMIN — ENOXAPARIN SODIUM 40 MG: 100 INJECTION SUBCUTANEOUS at 09:10

## 2024-07-19 RX ADMIN — GUAIFENESIN 600 MG: 600 TABLET, EXTENDED RELEASE ORAL at 14:27

## 2024-07-19 RX ADMIN — ACETAMINOPHEN 650 MG: 325 TABLET ORAL at 12:29

## 2024-07-19 RX ADMIN — ACETAMINOPHEN 650 MG: 325 TABLET ORAL at 20:09

## 2024-07-19 RX ADMIN — Medication 10 ML: at 20:11

## 2024-07-19 RX ADMIN — ZOLPIDEM TARTRATE 5 MG: 5 TABLET ORAL at 01:11

## 2024-07-19 RX ADMIN — SERTRALINE HYDROCHLORIDE 150 MG: 50 TABLET ORAL at 09:10

## 2024-07-19 RX ADMIN — PANTOPRAZOLE SODIUM 20 MG: 20 TABLET, DELAYED RELEASE ORAL at 05:13

## 2024-07-19 RX ADMIN — PANTOPRAZOLE SODIUM 20 MG: 20 TABLET, DELAYED RELEASE ORAL at 17:53

## 2024-07-19 RX ADMIN — WATER 60 MG: 1 INJECTION INTRAMUSCULAR; INTRAVENOUS; SUBCUTANEOUS at 17:53

## 2024-07-19 RX ADMIN — DILTIAZEM HYDROCHLORIDE 240 MG: 120 CAPSULE, COATED, EXTENDED RELEASE ORAL at 12:27

## 2024-07-19 RX ADMIN — POLYETHYLENE GLYCOL 3350 17 G: 17 POWDER, FOR SOLUTION ORAL at 18:27

## 2024-07-19 RX ADMIN — DOXYCYCLINE 100 MG: 100 INJECTION, POWDER, LYOPHILIZED, FOR SOLUTION INTRAVENOUS at 07:58

## 2024-07-19 RX ADMIN — WATER 80 MG: 1 INJECTION INTRAMUSCULAR; INTRAVENOUS; SUBCUTANEOUS at 05:12

## 2024-07-19 ASSESSMENT — PAIN SCALES - GENERAL
PAINLEVEL_OUTOF10: 4
PAINLEVEL_OUTOF10: 2

## 2024-07-19 ASSESSMENT — PAIN DESCRIPTION - LOCATION
LOCATION: BACK
LOCATION: BACK

## 2024-07-19 NOTE — ED NOTES
0.7 (*)     All other components within normal limits   URINALYSIS WITH MICROSCOPIC - Abnormal; Notable for the following components:    Protein, UA 30 (*)     Ketones, Urine TRACE (*)     All other components within normal limits     Abnormal Assessment Findings: Febrile at home, Tachycardia and pneumonia of bilateral lungs.     SAFETY    Mobility: limited transfer mobility   ED Fall Risk: Presents to emergency department  because of falls (Syncope, seizure, or loss of consciousness): No, Age > 70: Yes, Altered Mental Status, Intoxication with alcohol or substance confusion (Disorientation, impaired judgment, poor safety awaremess, or inability to follow instructions): No, Impaired Mobility: Ambulates or transfers with assistive devices or assistance; Unable to ambulate or transer.: Yes, Nursing Judgement: Yes   Restraints no   Sitter no     Background  History:   Past Medical History:   Diagnosis Date    Arrhythmia     fast HR    Arthritis     psoriatic    Asthma     Bronchiectasis (HCC)     Esophageal spasm     GERD (gastroesophageal reflux disease)     GI bleed     Pneumonia 1988, 6/2011       Assessment    Vitals/MEWS: MEWS Score: 4  Level of Consciousness: Alert (0)   Vitals:    07/18/24 1545 07/18/24 1600 07/18/24 1645 07/18/24 1845   BP: 137/74 (!) 140/71 (!) 152/83    Pulse: (!) 124 (!) 117 (!) 106 (!) 113   Resp: 17 28 16 (!) 32   Temp:    98.2 °F (36.8 °C)   TempSrc:    Oral   SpO2: 93% 94% 98% 96%   Weight:       Height:         DI:   Predictive Model Details          54 (Warning)  Factor Value    Calculated 7/18/2024 21:36 37% Respiratory rate 32    Deterioration Index Model 21% Age 74 years old     20% Supplemental oxygen Nasal cannula     9% Pulse 113     5% Systolic 152     3% WBC count 10.2 K/uL     3% Sodium abnormal (135 mmol/L)     1% Potassium 3.6 mmol/L     0% Hematocrit abnormal (33.8 %)     0% Pulse oximetry 96 %     0% Temperature 98.2 °F (36.8 °C)       FiO2 (%):   O2 Flow Rate: O2 Device:  113     8% O2 Delivery Method NASAL CANNULA     7% Age 74 years old     4% Albumin 3.4 g/dL     4% Has Imaging Procedure 1     -3% Change in Respirations 16 -> 32     -3% Chloride 103 mmol/L     -3% WBC Count 10.2 K/uL      Recent Labs     07/18/24  1518   WBC 10.2     Blood Cultures Drawn: Yes   Repeat LA: Time Due n/a  Antibiotic Given: Yes      Recommendation    Pending orders none.  Consults ordered: IP CONSULT TO HOSPITALIST  IP CONSULT TO PULMONOLOGY    Consulted provider:      Plan for next 24 hours: IV antibiotics   Additional Comments: n/a     If any further questions, please call Sending RN at [phone]  Electronically signed by: Electronically signed by Mitchell Romero RN on 7/18/2024 at 9:36 PM  Accepted by BENITO [place].

## 2024-07-19 NOTE — PROGRESS NOTES
Comprehensive Nutrition Assessment    Type and Reason for Visit: Initial, Positive Nutrition Screen    Nutrition Recommendations/Plan:   Continue current regular diet   Please document % intakes in I/O, assist pt with ordering alternative options as needed.        Malnutrition Assessment:  Malnutrition Status:  Mild malnutrition (07/19/24 1541)    Context:  Chronic Illness     Findings of the 6 clinical characteristics of malnutrition:  Energy Intake:  No significant decrease in energy intake  Weight Loss:  Greater than 20% over 1 year (likely)     Body Fat Loss:  No significant body fat loss     Muscle Mass Loss:  No significant muscle mass loss    Fluid Accumulation:  No significant fluid accumulation     Strength:  Not Performed       Nutrition Assessment:    74 y.o. female admitted for Pna. PMH significant for Arthritis, Bronchiectasis w/ chronic resp failure on home o2 , Esophageal spasms, hiatal hernia with GERD controlled with Lansoprazole, hx GI bleed. Pt reported surgical hx of kyphoplasty (3/2024) d/t compression fx sustained 12/2023. Reports decreased activity and appetite d/t this, though with recent improvements. NKFA.    RD assessment for MST 4: 27-33# wt los and poor appetite. Per EMR wt hx and pt, notable ~35# wt loss x18-12 months (22%, potentially significant depending on time frame) d/t aforementioned reasons. Noted recent improvements to appetite, pt stated her  does most of the grocery shopping and cooking now thyough he cooks more simple meals than what she used to make. No intakes yet documented in I/Os, RD arrived at beginning of Lunch. Will continue to monitor PO intakes and need for supplementation, RD provided pt with menu and educated on how to order different foods.      Nutritionally Significant Medications:  Cefepime, Vibramycin, Solumedrol, PPI, Zoloft.      Estimated Daily Nutrient Needs:  Energy Requirements Based On: Kcal/kg  Weight Used for Energy Requirements:

## 2024-07-19 NOTE — PLAN OF CARE
Problem: Occupational Therapy - Adult  Goal: By Discharge: Performs self-care activities at highest level of function for planned discharge setting.  See evaluation for individualized goals.  Description: FUNCTIONAL STATUS PRIOR TO ADMISSION:  Patient was ambulatory using no DME and was independent for ADLs,  assists with IADLs including cooking, grocery shopping, etc.     ADL Assistance: Independent, Homemaking Assistance: Independent (husbands does most of the housework and cooking), Ambulation Assistance: Independent, Transfer Assistance: Independent, Active : Yes     HOME SUPPORT: Patient lived with her  who assists primarily with IADLs.    Occupational Therapy Goals:  Initiated 7/19/2024  1.  Patient will perform grooming standing at sink with Modified [place] within 7 day(s).  2.  Patient will perform lower body dressing with Modified [place] within 7 day(s).  3.  Patient will perform bathing with Modified [place] within 7 day(s).  4.  Patient will perform toilet transfers with Modified [place]  within 7 day(s).  5.  Patient will perform all aspects of toileting with Modified [place] within 7 day(s).  6.  Patient will participate in upper extremity therapeutic exercise/activities with [place] for 10 minutes within 7 day(s).    7.  Patient will utilize energy conservation techniques during functional activities with verbal cues within 7 day(s).  Outcome: Progressing   OCCUPATIONAL THERAPY EVALUATION    Patient: Autumn Manjarrez (74 y.o. female)  Date: 7/19/2024  Primary Diagnosis: Tachycardia [R00.0]  Community acquired bacterial pneumonia [J15.9]  Febrile illness [R50.9]  Pneumonia of both lungs due to infectious organism, unspecified part of lung [J18.9]         Precautions: Fall Risk                  ASSESSMENT :  The patient is limited by decreased functional mobility, independence in ADLs, high-level IADLs, strength, activity tolerance, endurance, balance,

## 2024-07-19 NOTE — PROGRESS NOTES
Renal Monitoring of cefepime  Indication:  CAP  Current regimen:  cefepime 2g q12h    Recent Labs     24  1518 24  0652   CREATININE 0.93 0.52*   BUN 11 11     Est CrCl: 80-85 ml/min;   Temp (24hrs), Av.1 °F (36.7 °C), Min:97.5 °F (36.4 °C), Max:98.4 °F (36.9 °C)      Plan: Change to cefepime 2g q8h based on renal function and indication.

## 2024-07-19 NOTE — PROGRESS NOTES
Orders received, chart reviewed and patient evaluated by physical therapy. Pending progression with skilled acute physical therapy, recommend:  Therapy 2x a week in the home, however, may require supervision, cognitive and/or physical assistance due to the following concerns listed below:    Recommend with nursing OOB to chair 3x/day and walking daily with stand by assist. Thank you for completing as able in order to maintain patient strength, endurance and independence.     Full evaluation to follow.      Thank you for your consideration,    Zayra Zhang, PT, DPT

## 2024-07-19 NOTE — PROGRESS NOTES
[facility]                                                                                          Hospitalist Progress Note  Luis Daniel Castro MD  Office Phone: [phone]        Date of Service:  2024  NAME:  Autumn Manjarrez  :  1950  MRN:  [id number]       Admission Summary:   74 y.o. female with hx of bronchiectasis w/ chronic resp failure on home o2, asthma, psoriatric arthritis, gerd, recent uti on keflex who presented to ed with complaints of persistent fever. Reports malaise, fatigue, coug, mild dyspnea and low grade fevers over the last 5 days. Recently completed abx for uti and denies any symptoms. Referred to ed by outpatient ID.  The patient denies any fever, chills, chest or abdominal pain, nausea, vomiting, cough, congestion, recent illness, palpitations, or dysuria.       Interval history / Subjective:   Seen and examined, feels slightly better than last night, still with a significant cough, no CP. No significant back pain.     Assessment & Plan:     Bronchiectasis with acute exacerbation  Bacterial pneumonia  Sepsis syndrome  -cefepime and doxy  -solumedrol taper  -f/u cultures  -inhaled bronchodilators and mucolytics     Bronchiectasis w/ acute on chronic Resp Failure     HTN  -cont pta dilt     MDD  -pta zoloft     GERD  -protonix bid    T9 compression fracture noted on CT  D/w pt, her pain is mild, does not want a kypho citing her last one didn't really help her and her pain now is much more mild          Code status: full  Prophylaxis: Lovenox  Care Plan discussed with: pt rn cm   Anticipated Disposition:   Inpatient  Cardiac monitoring: Telemetry  Central Line:            Social Determinants of Health     Tobacco Use: Medium Risk (3/6/2024)    Patient History     Smoking Tobacco Use: Former     Smokeless Tobacco Use: Never     Passive Exposure: Not on file   Alcohol Use: Not on file   Financial Resource Strain: Not on file   Food  Insecurity: No Food Insecurity (7/18/2024)    Hunger Vital Sign     Worried About Running Out of Food in the Last Year: Never true     Ran Out of Food in the Last Year: Never true   Transportation Needs: No Transportation Needs (7/18/2024)    PRAPARE - Transportation     Lack of Transportation (Medical): No     Lack of Transportation (Non-Medical): No   Physical Activity: Not on file   Stress: Not on file   Social Connections: Not on file   Intimate Partner Violence: Not on file   Depression: Not on file   Housing Stability: High Risk (7/18/2024)    Housing Stability Vital Sign     Unable to Pay for Housing in the Last Year: No     Number of Places Lived in the Last Year: 1     Unstable Housing in the Last Year: Yes   Interpersonal Safety: Not At Risk (7/18/2024)    Interpersonal Safety Domain Source: IP Abuse Screening     Physical abuse: Denies     Verbal abuse: Denies     Emotional abuse: Denies     Financial abuse: Denies     Sexual abuse: Denies   Utilities: Not At Risk (7/18/2024)    [facility] Utilities     Threatened with loss of utilities: No       Review of Systems:   A comprehensive review of systems was negative.       Vital Signs:    Last 24hrs VS reviewed since prior progress note. Most recent are:  Vitals:    07/19/24 1200   BP:    Pulse: 63   Resp:    Temp:    SpO2:          Intake/Output Summary (Last 24 hours) at 7/19/2024 1321  Last data filed at 7/19/2024 0145  Gross per 24 hour   Intake --   Output 200 ml   Net -200 ml        Physical Examination:     I had a face to face encounter with this patient and independently examined them on 7/19/2024 as outlined below:          General : alert x 3, awake, no acute distress,   HEENT: PEERL, EOMI, moist mucus membrane  Neck: supple, no JVD, no meningeal signs  Chest: b/l wheezing and rhonchi  CVS: S1 S2 heard, Capillary refill less than 2 seconds  Abd: soft/ non tender, non distended, BS physiological,   Ext: no clubbing, no cyanosis, no edema, brisk 2+ DP

## 2024-07-19 NOTE — CONSULTS
Pulmonary Note  Name: Autumn Manjarrez     : 1950  Hospital: [facility]    Date: 2024 12:10 PM Date of Admission: 2024       Impression:   Plan:   -- Acute exacerbation chronic bronchiectasis, known obstructive lung disease by last PFTs  -- Hx MAC, ESBL ecoli  -- Psoriatic arthritis  -- GERD / hiatal hernia on imaging -- O2  -- Steroids = tapered from 80 q8 to 60 q12, if tolerates this can probably switch to 60mg prednisone daily and can more slowly taper from there  -- Empiric abx = we would probably favor augmentin as PO alternative after discharge, absent new sputum culture data  -- Sputum culture pending  -- Aggressive pulmonary clearance measures  -- Mobilize as able  -- DVT ppx    Thanks for the consult!  Available as needed over the weekend, will ask the office to give her a call for follow up     CC:   Chief Complaint   Patient presents with    Fever      Interval History:      Initial HPI:     74 yr old female with known bronchiectasis, follows with Dr. Pack as well as Dr. Addison [facility] ID.  Reports insidious onset of dyspnea past several days, quality breathlessness, associated productive/junky cough and low grade fevers, modified by worse with exertion.  This was while she was on keflex for a UTI.  Long pulm history, bronchiectasis initially started with MAC, hasn't had samples grow MAC in a while but more recently has had some with ESBL e.coli.  She had a pretty extensive pulmonary clearance regimen at home, but unfortunately for the past 6 months or so has been having back issues including needing kyphoplasty -- with this she has unfortunately not been able to use her HFCWO vest and feels her breathing is worse overall.    Exam Findings:  General: Awake, alert, no acute distress   HEENT: NC/AT, EOMI, moist mucous membranes   Neck: Supple, no meningismus, no masses or swelling   HEART: RRR, no murmurs/rubs/gallops   Lungs: No deformities, normal chest rise and fall, no  Muscle spasms 12/31/23   Debbie Guerra PA   azithromycin (ZITHROMAX) 500 MG tablet  7/17/23   Provider, MD Paula   ethambutol (MYAMBUTOL) 400 MG tablet  7/31/23   Provider, MD Paula   CLOFAZIMINE PO  4/7/23   Provider, MD Paula   ipratropium (ATROVENT) 0.02 % nebulizer solution  5/26/23   Provider, MD Paula   promethazine-codeine (PHENERGAN WITH CODEINE) 6.25-10 MG/5ML SOLN solution  9/10/23   Provider, MD Paula   tiotropium-olodaterol (STIOLTO RESPIMAT) 2.5-2.5 MCG/ACT AERS  6/20/23   Paula Frost MD   dilTIAZem (TIAZAC) 180 MG extended release capsule Take 1 capsule by mouth daily 6/24/13   Automatic Reconciliation, Ar   HYDROcodone-chlorpheniramine (TUSSIONEX) 10-8 MG/5ML SUER Take 5 mLs by mouth. 7/28/17   Automatic Reconciliation, Ar   lansoprazole (PREVACID) 15 MG delayed release capsule Take 15 mg by mouth every morning (before breakfast)    Automatic Reconciliation, Ar   levalbuterol (XOPENEX HFA) 45 MCG/ACT inhaler Inhale 2 puffs into the lungs as needed    Automatic Reconciliation, Ar   levoFLOXacin (LEVAQUIN) 750 MG tablet Take 750 mg by mouth daily 7/28/17   Automatic Reconciliation, Ar   mometasone-formoterol (DULERA) 200-5 MCG/ACT inhaler Inhale 2 puffs into the lungs 2 times daily    Automatic Reconciliation, Ar   montelukast (SINGULAIR) 10 MG tablet Take 10 mg by mouth daily    Automatic Reconciliation, Ar   sertraline (ZOLOFT) 100 MG tablet Take 1.5 tablets by mouth daily 8/20/13   Automatic Reconciliation, Ar        Allergies:   Allergies   Allergen Reactions    Nitrofurantoin Hives    Nsaids Other (See Comments)     GI bleeding.        Labs/Imaging:   Invalid input(s): \"PHI\", \"PCO2I\", \"PO2I\", \"HCO3I\", \"SO2I\", \"FIO2I\"  Recent Labs     07/18/24 1518 07/19/24  0652   WBC 10.2 3.9   HGB 11.3* 10.2*   HCT 33.8* 30.9*    221   MCV 92.6 92.8   MCH 31.0 30.6     Recent Labs     07/18/24 1518 07/19/24  0652   * 141   K 3.6 4.5    109*   CO2

## 2024-07-19 NOTE — PLAN OF CARE
Problem: Physical Therapy - Adult  Goal: By Discharge: Performs mobility at highest level of function for planned discharge setting.  See evaluation for individualized goals.  Description: FUNCTIONAL STATUS PRIOR TO ADMISSION: Patient was independent and active without use of DME.    HOME SUPPORT PRIOR TO ADMISSION: The patient lived with her  but did not require assistance. She reports her  did the driving and most of the ADL's around the house including cooking, cleaning and shopping.    Physical Therapy Goals  Initiated 7/19/2024  1.  Patient will move from supine to sit and sit to supine, scoot up and down, and roll side to side in bed with independence within 7 day(s).    2.  Patient will perform sit to stand with independence within 7 day(s).  3.  Patient will transfer from bed to chair and chair to bed with independence using the least restrictive device within 7 day(s).  4.  Patient will ambulate with independence for 50 feet with the least restrictive device within 7 day(s).   5.  Patient will ascend/descend 2 stairs with one handrail(s) with supervision/set-up within 7 day(s).   Outcome: Progressing     PHYSICAL THERAPY EVALUATION    Patient: Autumn Manjarrez (74 y.o. female)  Date: 7/19/2024  Primary Diagnosis: Tachycardia [R00.0]  Community acquired bacterial pneumonia [J15.9]  Febrile illness [R50.9]  Pneumonia of both lungs due to infectious organism, unspecified part of lung [J18.9]       Precautions: Restrictions/Precautions: Fall Risk                      ASSESSMENT :   DEFICITS/IMPAIRMENTS:   The patient is limited by decreased functional mobility, strength, activity tolerance, balance, increased pain levels. This is a 74 year old female who was admitted with pneumonia of bilateral lungs. She reports using oxygen at night at baseline but on RA during the day. She is received in bed on 3L supplemental O2. CT scan showed incidental finding of T9 compression fracture therefore she is  environment    IF patient discharges home will need the following DME: none                SUBJECTIVE:   Patient stated “I don't feel that short of breath.”    OBJECTIVE DATA SUMMARY:       Past Medical History:   Diagnosis Date    Arrhythmia     fast HR    Arthritis     psoriatic    Asthma     Bronchiectasis (HCC)     Esophageal spasm     GERD (gastroesophageal reflux disease)     GI bleed     Pneumonia 1988, 6/2011     Past Surgical History:   Procedure Laterality Date    CATARACT REMOVAL  1/2011    lens implant (L) eye/and right eye    COLONOSCOPY  10/23/12    Brand-int hemorrhoids,     COLONOSCOPY N/A 9/22/2023    COLONOSCOPY WITH BIOPSY performed by Edward Mackenzie MD at [facility] ENDOSCOPY    GYN  1985    laparoscopy to become pregnant (endometrosis)    IR KYPHOPLASTY THORACIC FIRST LEVEL  3/6/2024    IR KYPHOPLASTY THORACIC FIRST LEVEL 3/6/2024 [facility] RAD ANGIO IR    PEDRO MAMMOGRAM DIGITAL BILATERAL  2/13    PAP SMEAR  8/12    UPPER GASTROINTESTINAL ENDOSCOPY      Brand    UPPER GASTROINTESTINAL ENDOSCOPY N/A 9/22/2023    EGD, COLONOSCOPY performed by Edward Mackenzie MD at [facility] ENDOSCOPY       Home Situation:  Social/Functional History  Lives With: Spouse  Type of Home: House  Home Layout: One level  Home Access: Stairs to enter without rails  Entrance Stairs - Number of Steps: 1  Bathroom Shower/Tub: Tub/Shower unit  Bathroom Toilet: Handicap height  Bathroom Equipment: Shower chair  Home Equipment: Oxygen (3L at night, PRN during day)  Has the patient had two or more falls in the past year or any fall with injury in the past year?: No  ADL Assistance: Independent  Homemaking Assistance: Independent (husbands does most of the housework and cooking)  Ambulation Assistance: Independent  Transfer Assistance: Independent  Active : Yes  Occupation: Retired  Type of Occupation: real estate    Cognitive/Behavioral Status:  Orientation  Overall Orientation Status: Within Normal Limits  Orientation Level: Oriented  X4  Cognition  Overall Cognitive Status: WNL    Hearing:   Hearing  Hearing: Within functional limits    Vision/Perceptual:    Vision - Basic Assessment  Prior Vision: Wears glasses all the time  Patient Visual Report: No visual complaint reported.     Vision  Vision: Impaired  Vision Exceptions: Wears glasses at all times  Perception  Overall Perceptual Status: WFL    Strength:    Strength: Generally decreased, functional    Tone & Sensation:   Tone: Normal  Sensation: Intact    Coordination:  Coordination: Generally decreased, functional    Range Of Motion:  AROM: Generally decreased, functional  PROM: Generally decreased, functional  Maintained BLT spinal precautions during session, noted to be kyphotic in posture at baseline.    Functional Mobility:  Bed Mobility:     Bed Mobility Training  Bed Mobility Training: Yes  Rolling: Stand-by assistance (to log roll)  Supine to Sit: Contact-guard assistance  Scooting: Stand-by assistance;Additional time  Transfers:     Transfer Training  Transfer Training: Yes  Sit to Stand: Contact-guard assistance  Stand to Sit: Contact-guard assistance  Toilet Transfer: Contact-guard assistance (infer to BSC)  Balance:               Balance  Sitting: Intact  Standing: Impaired  Standing - Static: Fair  Standing - Dynamic: Fair;Occasional  Ambulation/Gait Training:     Gait  Gait Training: Yes  Overall Level of Assistance: Contact-guard assistance  Distance (ft): 24 Feet  Assistive Device: None  Interventions: Verbal cues  Speed/[place]: Pace decreased (< 100 feet/min)  Step Length: Right shortened;Left shortened  Gait Abnormalities: Decreased step clearance;Path deviations;Trunk sway increased                                                                                                                                                                                                                                     [facility] AM-PAC®      Basic Mobility Inpatient Short Form

## 2024-07-20 LAB
ANION GAP SERPL CALC-SCNC: 6 MMOL/L (ref 5–15)
BACTERIA SPEC CULT: NORMAL
BACTERIA SPEC CULT: NORMAL
BASOPHILS # BLD: 0 K/UL (ref 0–0.1)
BASOPHILS NFR BLD: 0 % (ref 0–1)
BUN SERPL-MCNC: 16 MG/DL (ref 6–20)
BUN/CREAT SERPL: 29 (ref 12–20)
CALCIUM SERPL-MCNC: 8.9 MG/DL (ref 8.5–10.1)
CHLORIDE SERPL-SCNC: 110 MMOL/L (ref 97–108)
CO2 SERPL-SCNC: 24 MMOL/L (ref 21–32)
CREAT SERPL-MCNC: 0.55 MG/DL (ref 0.55–1.02)
DIFFERENTIAL METHOD BLD: ABNORMAL
EOSINOPHIL # BLD: 0 K/UL (ref 0–0.4)
EOSINOPHIL NFR BLD: 0 % (ref 0–7)
ERYTHROCYTE [DISTWIDTH] IN BLOOD BY AUTOMATED COUNT: 13.9 % (ref 11.5–14.5)
GLUCOSE SERPL-MCNC: 149 MG/DL (ref 65–100)
GRAM STN SPEC: NORMAL
HCT VFR BLD AUTO: 28.4 % (ref 35–47)
HGB BLD-MCNC: 9.2 G/DL (ref 11.5–16)
IMM GRANULOCYTES # BLD AUTO: 0.1 K/UL (ref 0–0.04)
IMM GRANULOCYTES NFR BLD AUTO: 1 % (ref 0–0.5)
LYMPHOCYTES # BLD: 0.7 K/UL (ref 0.8–3.5)
LYMPHOCYTES NFR BLD: 9 % (ref 12–49)
MAGNESIUM SERPL-MCNC: 2 MG/DL (ref 1.6–2.4)
MCH RBC QN AUTO: 30.4 PG (ref 26–34)
MCHC RBC AUTO-ENTMCNC: 32.4 G/DL (ref 30–36.5)
MCV RBC AUTO: 93.7 FL (ref 80–99)
MONOCYTES # BLD: 0.3 K/UL (ref 0–1)
MONOCYTES NFR BLD: 4 % (ref 5–13)
NEUTS SEG # BLD: 6.4 K/UL (ref 1.8–8)
NEUTS SEG NFR BLD: 86 % (ref 32–75)
NRBC # BLD: 0 K/UL (ref 0–0.01)
NRBC BLD-RTO: 0 PER 100 WBC
PLATELET # BLD AUTO: 239 K/UL (ref 150–400)
PMV BLD AUTO: 9.7 FL (ref 8.9–12.9)
POTASSIUM SERPL-SCNC: 4.5 MMOL/L (ref 3.5–5.1)
RBC # BLD AUTO: 3.03 M/UL (ref 3.8–5.2)
RBC MORPH BLD: ABNORMAL
SERVICE CMNT-IMP: NORMAL
SERVICE CMNT-IMP: NORMAL
SODIUM SERPL-SCNC: 140 MMOL/L (ref 136–145)
WBC # BLD AUTO: 7.5 K/UL (ref 3.6–11)

## 2024-07-20 PROCEDURE — 80048 BASIC METABOLIC PNL TOTAL CA: CPT

## 2024-07-20 PROCEDURE — 6370000000 HC RX 637 (ALT 250 FOR IP): Performed by: NURSE PRACTITIONER

## 2024-07-20 PROCEDURE — 6360000002 HC RX W HCPCS: Performed by: PHYSICIAN ASSISTANT

## 2024-07-20 PROCEDURE — 6370000000 HC RX 637 (ALT 250 FOR IP): Performed by: PHYSICIAN ASSISTANT

## 2024-07-20 PROCEDURE — 6370000000 HC RX 637 (ALT 250 FOR IP): Performed by: HOSPITALIST

## 2024-07-20 PROCEDURE — 83735 ASSAY OF MAGNESIUM: CPT

## 2024-07-20 PROCEDURE — 2060000000 HC ICU INTERMEDIATE R&B

## 2024-07-20 PROCEDURE — 6370000000 HC RX 637 (ALT 250 FOR IP): Performed by: STUDENT IN AN ORGANIZED HEALTH CARE EDUCATION/TRAINING PROGRAM

## 2024-07-20 PROCEDURE — 2580000003 HC RX 258: Performed by: HOSPITALIST

## 2024-07-20 PROCEDURE — 2580000003 HC RX 258: Performed by: PHYSICIAN ASSISTANT

## 2024-07-20 PROCEDURE — 2700000000 HC OXYGEN THERAPY PER DAY

## 2024-07-20 PROCEDURE — 6360000002 HC RX W HCPCS: Performed by: STUDENT IN AN ORGANIZED HEALTH CARE EDUCATION/TRAINING PROGRAM

## 2024-07-20 PROCEDURE — 2500000003 HC RX 250 WO HCPCS: Performed by: STUDENT IN AN ORGANIZED HEALTH CARE EDUCATION/TRAINING PROGRAM

## 2024-07-20 PROCEDURE — 94760 N-INVAS EAR/PLS OXIMETRY 1: CPT

## 2024-07-20 PROCEDURE — 2580000003 HC RX 258: Performed by: STUDENT IN AN ORGANIZED HEALTH CARE EDUCATION/TRAINING PROGRAM

## 2024-07-20 PROCEDURE — 94640 AIRWAY INHALATION TREATMENT: CPT

## 2024-07-20 PROCEDURE — 36415 COLL VENOUS BLD VENIPUNCTURE: CPT

## 2024-07-20 PROCEDURE — 6360000002 HC RX W HCPCS: Performed by: HOSPITALIST

## 2024-07-20 PROCEDURE — 85025 COMPLETE CBC W/AUTO DIFF WBC: CPT

## 2024-07-20 RX ORDER — OXYCODONE HYDROCHLORIDE 5 MG/1
5 TABLET ORAL EVERY 6 HOURS PRN
Status: DISCONTINUED | OUTPATIENT
Start: 2024-07-20 | End: 2024-07-21 | Stop reason: HOSPADM

## 2024-07-20 RX ADMIN — DILTIAZEM HYDROCHLORIDE 240 MG: 120 CAPSULE, COATED, EXTENDED RELEASE ORAL at 08:45

## 2024-07-20 RX ADMIN — CEFEPIME 2000 MG: 2 INJECTION, POWDER, FOR SOLUTION INTRAVENOUS at 08:54

## 2024-07-20 RX ADMIN — WATER 60 MG: 1 INJECTION INTRAMUSCULAR; INTRAVENOUS; SUBCUTANEOUS at 05:22

## 2024-07-20 RX ADMIN — PANTOPRAZOLE SODIUM 20 MG: 20 TABLET, DELAYED RELEASE ORAL at 16:12

## 2024-07-20 RX ADMIN — OXYCODONE 5 MG: 5 TABLET ORAL at 20:56

## 2024-07-20 RX ADMIN — ENOXAPARIN SODIUM 40 MG: 100 INJECTION SUBCUTANEOUS at 08:48

## 2024-07-20 RX ADMIN — ARFORMOTEROL TARTRATE 15 MCG: 15 SOLUTION RESPIRATORY (INHALATION) at 20:32

## 2024-07-20 RX ADMIN — ARFORMOTEROL TARTRATE: 15 SOLUTION RESPIRATORY (INHALATION) at 05:22

## 2024-07-20 RX ADMIN — ZOLPIDEM TARTRATE 5 MG: 5 TABLET ORAL at 23:51

## 2024-07-20 RX ADMIN — PANTOPRAZOLE SODIUM 20 MG: 20 TABLET, DELAYED RELEASE ORAL at 05:23

## 2024-07-20 RX ADMIN — POLYETHYLENE GLYCOL 3350 17 G: 17 POWDER, FOR SOLUTION ORAL at 16:12

## 2024-07-20 RX ADMIN — CEFEPIME 2000 MG: 2 INJECTION, POWDER, FOR SOLUTION INTRAVENOUS at 00:09

## 2024-07-20 RX ADMIN — DOXYCYCLINE 100 MG: 100 INJECTION, POWDER, LYOPHILIZED, FOR SOLUTION INTRAVENOUS at 11:41

## 2024-07-20 RX ADMIN — Medication 10 ML: at 20:56

## 2024-07-20 RX ADMIN — GUAIFENESIN 600 MG: 600 TABLET, EXTENDED RELEASE ORAL at 08:47

## 2024-07-20 RX ADMIN — DOXYCYCLINE 100 MG: 100 INJECTION, POWDER, LYOPHILIZED, FOR SOLUTION INTRAVENOUS at 22:33

## 2024-07-20 RX ADMIN — ACETAMINOPHEN 650 MG: 325 TABLET ORAL at 19:02

## 2024-07-20 RX ADMIN — Medication 10 ML: at 08:47

## 2024-07-20 RX ADMIN — IPRATROPIUM BROMIDE 0.5 MG: 0.5 SOLUTION RESPIRATORY (INHALATION) at 20:32

## 2024-07-20 RX ADMIN — CEFEPIME 2000 MG: 2 INJECTION, POWDER, FOR SOLUTION INTRAVENOUS at 16:15

## 2024-07-20 RX ADMIN — SERTRALINE HYDROCHLORIDE 100 MG: 50 TABLET ORAL at 08:46

## 2024-07-20 RX ADMIN — ACETAMINOPHEN 650 MG: 325 TABLET ORAL at 13:03

## 2024-07-20 RX ADMIN — GUAIFENESIN 600 MG: 600 TABLET, EXTENDED RELEASE ORAL at 20:56

## 2024-07-20 RX ADMIN — IPRATROPIUM BROMIDE 0.5 MG: 0.5 SOLUTION RESPIRATORY (INHALATION) at 05:23

## 2024-07-20 RX ADMIN — ZOLPIDEM TARTRATE 5 MG: 5 TABLET ORAL at 00:07

## 2024-07-20 RX ADMIN — ARFORMOTEROL TARTRATE 15 MCG: 15 SOLUTION RESPIRATORY (INHALATION) at 05:23

## 2024-07-20 ASSESSMENT — PAIN SCALES - GENERAL
PAINLEVEL_OUTOF10: 0
PAINLEVEL_OUTOF10: 2
PAINLEVEL_OUTOF10: 0
PAINLEVEL_OUTOF10: 7
PAINLEVEL_OUTOF10: 8
PAINLEVEL_OUTOF10: 0
PAINLEVEL_OUTOF10: 0

## 2024-07-20 ASSESSMENT — PAIN DESCRIPTION - DESCRIPTORS: DESCRIPTORS: ACHING

## 2024-07-20 ASSESSMENT — PAIN DESCRIPTION - ORIENTATION: ORIENTATION: MID;LOWER

## 2024-07-20 ASSESSMENT — PAIN DESCRIPTION - LOCATION
LOCATION: BACK
LOCATION: BACK

## 2024-07-20 NOTE — PROGRESS NOTES
and affect, CN 2-12 grossly intact, sensory grossly within normal limit, Strength 5/5 in all extremities  Skin: warm     Data Review:    Review and/or order of clinical lab test  Review and/or order of tests in the radiology section of CPT  Review and/or order of tests in the medicine section of CPT      I have personally and independently reviewed all pertinent labs, diagnostic studies, imaging, and have provided independent interpretation of the same.     Labs:     Recent Labs     07/19/24  0652 07/20/24  0524   WBC 3.9 7.5   HGB 10.2* 9.2*   HCT 30.9* 28.4*    239       Recent Labs     07/18/24  1518 07/19/24  0652 07/20/24  0524   * 141 140   K 3.6 4.5 4.5    109* 110*   CO2 28 25 24   BUN 11 11 16   MG  --   --  2.0       Recent Labs     07/18/24  1518 07/19/24  0652   ALT 29 25   GLOB 5.2* 3.8       No results for input(s): \"INR\", \"APTT\" in the last 72 hours.    Invalid input(s): \"PTP\"   No results for input(s): \"TIBC\" in the last 72 hours.    Invalid input(s): \"FE\", \"PSAT\", \"FERR\"   No results found for: \"RBCF\"   No results for input(s): \"PH\", \"PCO2\", \"PO2\" in the last 72 hours.  No results for input(s): \"CPK\" in the last 72 hours.    Invalid input(s): \"CPKMB\", \"CKNDX\", \"TROIQ\"  No results found for: \"CHOL\", \"CHLST\", \"CHOLV\", \"HDL\", \"HDLC\", \"LDL\"  No results found for: \"GLUCPOC\"  [unfilled]    Notes reviewed from all clinical/nonclinical/nursing services involved in patient's clinical care. Care coordination discussions were held with appropriate clinical/nonclinical/ nursing providers based on care coordination needs.         Patients current active Medications were reviewed, considered, added and adjusted based on the clinical condition today.      Home Medications were reconciled to the best of my ability given all available resources at the time of admission. Route is PO if not otherwise noted.      Admission Status:[id number]::[id number]}      Medications Reviewed:     Current Facility-Administered  Medications   Medication Dose Route Frequency    [START ON 7/21/2024] predniSONE (DELTASONE) tablet 60 mg  60 mg Oral Daily    zolpidem (AMBIEN) tablet 5 mg  5 mg Oral Nightly PRN    arformoterol tartrate (BROVANA) nebulizer solution 15 mcg  15 mcg Nebulization BID RT    And    ipratropium (ATROVENT) 0.02 % nebulizer solution 0.5 mg  0.5 mg Nebulization BID RT    sertraline (ZOLOFT) tablet 100 mg  100 mg Oral Daily    dilTIAZem (CARDIZEM CD) extended release capsule 240 mg  240 mg Oral Daily    ceFEPIme (MAXIPIME) 2,000 mg in sodium chloride 0.9 % 100 mL IVPB (mini-bag)  2,000 mg IntraVENous Q8H    guaiFENesin (MUCINEX) extended release tablet 600 mg  600 mg Oral BID    sodium chloride nebulizer 0.9 % solution 3 mL  3 mL Nebulization PRN    doxycycline (VIBRAMYCIN) 100 mg in sodium chloride 0.9 % 100 mL IVPB (mini-bag)  100 mg IntraVENous Q12H    pantoprazole (PROTONIX) tablet 20 mg  20 mg Oral BID AC    arformoterol 15 mcg-budesonide 0.5 mg neb solution   Nebulization BID RT    montelukast (SINGULAIR) tablet 10 mg  10 mg Oral Daily    promethazine-codeine (PHENERGAN with CODEINE) 6.25-10 MG/5ML solution 5 mL  5 mL Oral Q4H PRN    iopamidol (ISOVUE-370) 76 % injection 100 mL  100 mL IntraVENous ONCE PRN    sodium chloride flush 0.9 % injection 5-40 mL  5-40 mL IntraVENous 2 times per day    sodium chloride flush 0.9 % injection 5-40 mL  5-40 mL IntraVENous PRN    0.9 % sodium chloride infusion   IntraVENous PRN    potassium chloride (KLOR-CON) extended release tablet 40 mEq  40 mEq Oral PRN    Or    potassium bicarb-citric acid (EFFER-K) effervescent tablet 40 mEq  40 mEq Oral PRN    Or    potassium chloride 10 mEq/100 mL IVPB (Peripheral Line)  10 mEq IntraVENous PRN    magnesium sulfate 2000 mg in 50 mL IVPB premix  2,000 mg IntraVENous PRN    enoxaparin (LOVENOX) injection 40 mg  40 mg SubCUTAneous Daily    ondansetron (ZOFRAN-ODT) disintegrating tablet 4 mg  4 mg Oral Q8H PRN    Or    ondansetron (ZOFRAN)

## 2024-07-20 NOTE — PROGRESS NOTES
The  attempted visit. The patient was asleep at the time of the visit. The 's visit was incomplete.    Ary Gibbons M.Div., .M., M.A.C.E.   Intern  [facility]  Paging Service [phone] (SANDRA)

## 2024-07-21 VITALS
OXYGEN SATURATION: 96 % | DIASTOLIC BLOOD PRESSURE: 61 MMHG | HEIGHT: 65 IN | RESPIRATION RATE: 18 BRPM | SYSTOLIC BLOOD PRESSURE: 111 MMHG | HEART RATE: 94 BPM | WEIGHT: 120 LBS | BODY MASS INDEX: 19.99 KG/M2 | TEMPERATURE: 98.1 F

## 2024-07-21 PROCEDURE — 94640 AIRWAY INHALATION TREATMENT: CPT

## 2024-07-21 PROCEDURE — 6370000000 HC RX 637 (ALT 250 FOR IP): Performed by: PHYSICIAN ASSISTANT

## 2024-07-21 PROCEDURE — 2580000003 HC RX 258: Performed by: HOSPITALIST

## 2024-07-21 PROCEDURE — 6360000002 HC RX W HCPCS: Performed by: STUDENT IN AN ORGANIZED HEALTH CARE EDUCATION/TRAINING PROGRAM

## 2024-07-21 PROCEDURE — 2580000003 HC RX 258: Performed by: STUDENT IN AN ORGANIZED HEALTH CARE EDUCATION/TRAINING PROGRAM

## 2024-07-21 PROCEDURE — 6360000002 HC RX W HCPCS: Performed by: HOSPITALIST

## 2024-07-21 PROCEDURE — 6370000000 HC RX 637 (ALT 250 FOR IP): Performed by: HOSPITALIST

## 2024-07-21 PROCEDURE — 6370000000 HC RX 637 (ALT 250 FOR IP): Performed by: STUDENT IN AN ORGANIZED HEALTH CARE EDUCATION/TRAINING PROGRAM

## 2024-07-21 RX ORDER — AMOXICILLIN AND CLAVULANATE POTASSIUM 500; 125 MG/1; MG/1
1 TABLET, FILM COATED ORAL 3 TIMES DAILY
Qty: 30 TABLET | Refills: 0 | Status: SHIPPED | OUTPATIENT
Start: 2024-07-21 | End: 2024-07-31

## 2024-07-21 RX ORDER — AMOXICILLIN AND CLAVULANATE POTASSIUM 500; 125 MG/1; MG/1
1 TABLET, FILM COATED ORAL 3 TIMES DAILY
Qty: 30 TABLET | Refills: 0 | Status: SHIPPED | OUTPATIENT
Start: 2024-07-21 | End: 2024-07-21

## 2024-07-21 RX ORDER — SACCHAROMYCES BOULARDII 250 MG
250 CAPSULE ORAL 2 TIMES DAILY
Qty: 14 CAPSULE | Refills: 0 | Status: SHIPPED | OUTPATIENT
Start: 2024-07-21 | End: 2024-07-21

## 2024-07-21 RX ORDER — PREDNISONE 10 MG/1
TABLET ORAL
Qty: 33 TABLET | Refills: 0 | Status: SHIPPED | OUTPATIENT
Start: 2024-07-22 | End: 2024-08-06

## 2024-07-21 RX ORDER — PREDNISONE 10 MG/1
TABLET ORAL
Qty: 33 TABLET | Refills: 0 | Status: SHIPPED | OUTPATIENT
Start: 2024-07-22 | End: 2024-07-21

## 2024-07-21 RX ORDER — SACCHAROMYCES BOULARDII 250 MG
250 CAPSULE ORAL 2 TIMES DAILY
Qty: 14 CAPSULE | Refills: 0 | Status: SHIPPED | OUTPATIENT
Start: 2024-07-21 | End: 2024-07-28

## 2024-07-21 RX ADMIN — CEFEPIME 2000 MG: 2 INJECTION, POWDER, FOR SOLUTION INTRAVENOUS at 09:26

## 2024-07-21 RX ADMIN — CEFEPIME 2000 MG: 2 INJECTION, POWDER, FOR SOLUTION INTRAVENOUS at 01:19

## 2024-07-21 RX ADMIN — SERTRALINE HYDROCHLORIDE 100 MG: 50 TABLET ORAL at 09:22

## 2024-07-21 RX ADMIN — Medication 10 ML: at 10:12

## 2024-07-21 RX ADMIN — IPRATROPIUM BROMIDE 0.5 MG: 0.5 SOLUTION RESPIRATORY (INHALATION) at 09:11

## 2024-07-21 RX ADMIN — MONTELUKAST 10 MG: 10 TABLET, FILM COATED ORAL at 09:22

## 2024-07-21 RX ADMIN — ARFORMOTEROL TARTRATE: 15 SOLUTION RESPIRATORY (INHALATION) at 09:11

## 2024-07-21 RX ADMIN — ARFORMOTEROL TARTRATE 15 MCG: 15 SOLUTION RESPIRATORY (INHALATION) at 09:12

## 2024-07-21 RX ADMIN — GUAIFENESIN 600 MG: 600 TABLET, EXTENDED RELEASE ORAL at 09:21

## 2024-07-21 RX ADMIN — DILTIAZEM HYDROCHLORIDE 240 MG: 120 CAPSULE, COATED, EXTENDED RELEASE ORAL at 09:21

## 2024-07-21 RX ADMIN — ENOXAPARIN SODIUM 40 MG: 100 INJECTION SUBCUTANEOUS at 09:22

## 2024-07-21 RX ADMIN — PANTOPRAZOLE SODIUM 20 MG: 20 TABLET, DELAYED RELEASE ORAL at 06:05

## 2024-07-21 RX ADMIN — PREDNISONE 60 MG: 50 TABLET ORAL at 09:22

## 2024-07-21 ASSESSMENT — PAIN SCALES - GENERAL: PAINLEVEL_OUTOF10: 0

## 2024-07-21 NOTE — DISCHARGE SUMMARY
Discharge Summary       PATIENT ID: Autumn Manjarrez  MRN: [id number]   YOB: 1950    DATE OF ADMISSION: 7/18/2024  3:28 PM    DATE OF DISCHARGE: 7/21/2024   PRIMARY CARE PROVIDER: Akosua Phillips MD     ATTENDING PHYSICIAN: Gloria  DISCHARGING PROVIDER: Luis Daniel Castro MD    To contact this individual call [phone] and ask the  to page.  If unavailable ask to be transferred the Adult Hospitalist Department.    CONSULTATIONS: IP CONSULT TO HOSPITALIST  IP CONSULT TO PULMONOLOGY    PROCEDURES/SURGERIES: * No surgery found *     ADMITTING DIAGNOSES & HOSPITAL COURSE:   Bronchiectasis with acute exacerbation  Bacterial pneumonia  Sepsis syndrome  Bronchiectasis w/ acute on chronic Resp Failure  HTN  MDD  GERD  T9 compression fracture, acute    74 y.o. female with hx of bronchiectasis w/ chronic resp failure on home o2, asthma, psoriatric arthritis, gerd, recent uti on keflex who presented to ed with complaints of persistent fever. Reports malaise, fatigue, coug, mild dyspnea and low grade fevers over the last 5 days. Recently completed abx for uti and denies any symptoms. Referred to ed by outpatient ID.     All cultures NGTD including sputum.  Clinically improved on cefepime and doxycycline, steroids, nebs.  Pulm consulted and rec steroid taper and Augmentin.      T9 compression fracture noted on CT  D/w pt, her pain is mild, does not want a kypho citing her last one didn't really help her and her pain now is much more mild    DISCHARGE DIAGNOSES / PLAN:      FOLLOW UP APPOINTMENTS:    Follow-up Information       Follow up With Specialties Details Why Contact Info    Akosua Phillips MD Internal Medicine Follow up  [address] [facility] [id number] [phone]          Pulm office will call her for follow-up      DISCHARGE MEDICATIONS:     Medication List        START taking these medications      amoxicillin-clavulanate 500-125 MG per tablet  Commonly known as:

## 2024-07-21 NOTE — PROGRESS NOTES
Medication List        START taking these medications      amoxicillin-clavulanate 500-125 MG per tablet  Commonly known as: Augmentin  Take 1 tablet by mouth 3 times daily for 10 days     predniSONE 10 MG tablet  Commonly known as: DELTASONE  Take 4 tablets by mouth daily for 3 days, THEN 3 tablets daily for 3 days, THEN 2 tablets daily for 3 days, THEN 1 tablet daily for 3 days, THEN 1 tablet daily for 3 days.  Start taking on: July 22, 2024     saccharomyces boulardii 250 MG capsule  Commonly known as: Florastor  Take 1 capsule by mouth 2 times daily for 7 days            CONTINUE taking these medications      CLOFAZIMINE PO     cyclobenzaprine 10 MG tablet  Commonly known as: FLEXERIL  Take 1 tablet by mouth 3 times daily as needed for Muscle spasms     dilTIAZem 180 MG extended release capsule  Commonly known as: TIAZAC     HYDROcodone-chlorpheniramine 10-8 MG/5ML Suer  Commonly known as: TUSSIONEX     ipratropium 0.02 % nebulizer solution  Commonly known as: ATROVENT     lansoprazole 15 MG delayed release capsule  Commonly known as: PREVACID     levalbuterol 45 MCG/ACT inhaler  Commonly known as: XOPENEX HFA     mometasone-formoterol 200-5 MCG/ACT inhaler  Commonly known as: DULERA     montelukast 10 MG tablet  Commonly known as: SINGULAIR     promethazine-codeine 6.25-10 MG/5ML Soln solution  Commonly known as: PHENERGAN with CODEINE     sertraline 100 MG tablet  Commonly known as: ZOLOFT     Stiolto Respimat 2.5-2.5 MCG/ACT Aers  Generic drug: tiotropium-olodaterol            STOP taking these medications      azithromycin 500 MG tablet  Commonly known as: ZITHROMAX     ethambutol 400 MG tablet  Commonly known as: MYAMBUTOL     levoFLOXacin 750 MG tablet  Commonly known as: LEVAQUIN               Where to Get Your Medications        These medications were sent to [facility]  [facility] [address] - P [phone] - F [phone] [id number] [facility] [address]      Phone:

## 2024-07-23 LAB
BACTERIA SPEC CULT: NORMAL
BACTERIA SPEC CULT: NORMAL
SERVICE CMNT-IMP: NORMAL
SERVICE CMNT-IMP: NORMAL

## 2024-09-04 NOTE — PROGRESS NOTES
Physician Progress Note      PATIENT:               SUZETTE HAWK  CSN #:                  [id number]  :                       1950  ADMIT DATE:       2024 3:28 PM  DISCH DATE:        2024 1:42 PM  RESPONDING  PROVIDER #:        Luis Daniel Castro MD          QUERY TEXT:    Patient admitted with Sepsis in H&P .  In order to support the diagnosis   of sepsis, please include additional clinical indicators in your   documentation.  Or please document if the diagnosis of sepsis has been ruled   out after further study    The medical record reflects the following:  Risk Factors: Pneumonia, HTN  H&P  Sepsis 2/2 CAP cxr w/ concerns for worsening bronchiectasis and PNA.   follow blood and urine cultures.  IM PN  Bronchiectasis with acute exacerbation, Bacterial pneumonia, Sepsis   syndrome, cefepime and doxy.  DS   recent uti on Keflex who presented to ed with complaints of   persistent fever. Reports malaise, fatigue, cough, mild dyspnea and low grade   fevers over the last 5 days. Recently completed abx for uti and denies any   symptoms.  Sepsis syndrome, Bacterial pneumonia.    Treatment - sodium chloride 0.9 % bolus, IV Levaquin, IV cefepime, IV   doxycycline,    Thank You  Magui Hui [facility] CDS  Options provided:  -- Sepsis present as evidenced by  -- Sepsis was ruled out after study  -- Other - I will add my own diagnosis  -- Disagree - Not applicable / Not valid  -- Disagree - Clinically unable to determine / Unknown  -- Refer to Clinical Documentation Reviewer    PROVIDER RESPONSE TEXT:    Sepsis was ruled out after study.    Query created by: Magui Adan on 2024 12:28 AM      QUERY TEXT:    Patient admitted with Sepsis. Noted to have Mild malnutrition Nutrition PN   . If possible, please document in progress notes and discharge summary if   you are evaluating and /or treating any of the following:      The medical record reflects the following:  Risk  none

## (undated) DEVICE — CATHETER PH SUCT 14FR

## (undated) DEVICE — FORCEPS BX L240CM JAW DIA2.8MM L CAP W/ NDL MIC MESH TOOTH